# Patient Record
Sex: MALE | Race: OTHER | HISPANIC OR LATINO | Employment: UNEMPLOYED | URBAN - METROPOLITAN AREA
[De-identification: names, ages, dates, MRNs, and addresses within clinical notes are randomized per-mention and may not be internally consistent; named-entity substitution may affect disease eponyms.]

---

## 2021-06-07 DIAGNOSIS — U07.1 COVID-19 VIRUS DETECTED: ICD-10-CM

## 2021-06-10 PROBLEM — I10 ESSENTIAL HYPERTENSION: Status: ACTIVE | Noted: 2021-06-10

## 2021-06-10 PROBLEM — U07.1 COVID-19: Status: ACTIVE | Noted: 2021-06-10

## 2021-06-10 PROBLEM — E87.1 HYPONATREMIA: Status: ACTIVE | Noted: 2021-06-10

## 2021-06-11 PROBLEM — R73.03 PREDIABETES: Status: ACTIVE | Noted: 2021-06-11

## 2021-06-17 ENCOUNTER — PATIENT OUTREACH (OUTPATIENT)
Dept: ADMINISTRATIVE | Facility: CLINIC | Age: 51
End: 2021-06-17

## 2023-10-05 ENCOUNTER — DOCUMENTATION ONLY (OUTPATIENT)
Dept: SLEEP MEDICINE | Facility: HOSPITAL | Age: 53
End: 2023-10-05
Payer: MEDICAID

## 2023-10-17 ENCOUNTER — HOSPITAL ENCOUNTER (OUTPATIENT)
Dept: RADIOLOGY | Facility: HOSPITAL | Age: 53
Discharge: HOME | End: 2023-10-17
Attending: STUDENT IN AN ORGANIZED HEALTH CARE EDUCATION/TRAINING PROGRAM
Payer: MEDICAID

## 2023-10-17 DIAGNOSIS — R06.09 DOE (DYSPNEA ON EXERTION): ICD-10-CM

## 2023-10-17 DIAGNOSIS — J98.4 RESTRICTIVE LUNG DISEASE: ICD-10-CM

## 2023-10-17 PROCEDURE — 71250 CT THORAX DX C-: CPT | Mod: TC

## 2023-10-25 PROBLEM — R06.09 DOE (DYSPNEA ON EXERTION): Status: ACTIVE | Noted: 2023-10-25

## 2023-10-25 PROBLEM — J84.9 ILD (INTERSTITIAL LUNG DISEASE): Status: ACTIVE | Noted: 2023-10-25

## 2023-12-12 ENCOUNTER — DOCUMENTATION ONLY (OUTPATIENT)
Dept: SLEEP MEDICINE | Facility: HOSPITAL | Age: 53
End: 2023-12-12
Payer: MEDICAID

## 2023-12-12 PROBLEM — E66.01 CLASS 2 SEVERE OBESITY DUE TO EXCESS CALORIES WITH SERIOUS COMORBIDITY AND BODY MASS INDEX (BMI) OF 37.0 TO 37.9 IN ADULT: Status: ACTIVE | Noted: 2023-12-12

## 2023-12-12 PROBLEM — U07.1 COVID-19: Status: RESOLVED | Noted: 2021-06-10 | Resolved: 2023-12-12

## 2023-12-12 PROBLEM — I20.89 EQUIVALENT ANGINA: Status: ACTIVE | Noted: 2023-12-12

## 2023-12-12 PROBLEM — Z91.89 MULTIPLE RISK FACTORS FOR CORONARY ARTERY DISEASE: Status: ACTIVE | Noted: 2023-12-12

## 2023-12-12 PROBLEM — Z82.49 FAMILY HISTORY OF CARDIOVASCULAR DISEASE: Status: ACTIVE | Noted: 2023-12-12

## 2023-12-12 PROBLEM — Z86.16 HISTORY OF COVID-19: Status: ACTIVE | Noted: 2023-12-12

## 2023-12-12 PROBLEM — E66.812 CLASS 2 SEVERE OBESITY DUE TO EXCESS CALORIES WITH SERIOUS COMORBIDITY AND BODY MASS INDEX (BMI) OF 37.0 TO 37.9 IN ADULT: Status: ACTIVE | Noted: 2023-12-12

## 2023-12-12 PROBLEM — Z78.9 NONSMOKER: Status: ACTIVE | Noted: 2023-12-12

## 2024-01-03 ENCOUNTER — PATIENT MESSAGE (OUTPATIENT)
Dept: CARDIOTHORACIC SURGERY | Facility: CLINIC | Age: 54
End: 2024-01-03
Payer: MEDICAID

## 2024-01-05 NOTE — PROGRESS NOTES
History & Physical    SUBJECTIVE:     History of Present Illness:  Patient is a 53 y.o. male never smoker with HTN, IFG, obesity (BMI: 37.9) who is referred by Pulmonology, Dr. Pastor, evaluation of ILD and consideration for VATS wedge biopsy. Chest CT 12/27/23 shows reactive airspace disease (air trapping) as well as possible early interstitial lung disease with regions of bronchiectasis, pleuroparenchymal scarring and interlobular septal thickening. PFTs show restriction; however, FEV1 70%. Rheumatologic work-up normal.     Patient interview performed with a . Patient reports progressive dyspnea on exertion.  Denies SOB at rest. Denies cough, hemoptysis.     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      Review of patient's allergies indicates:   Allergen Reactions    Thiazides Other (See Comments)     Hyponatremia        Current Outpatient Medications   Medication Sig Dispense Refill    amLODIPine (NORVASC) 10 MG tablet Take 1 tablet (10 mg total) by mouth once daily. 90 tablet 3    losartan (COZAAR) 50 MG tablet Take 1 tablet (50 mg total) by mouth once daily. 30 tablet 5    metFORMIN (GLUCOPHAGE-XR) 500 MG ER 24hr tablet Take 2 tablets (1,000 mg total) by mouth daily with breakfast. Start with 1 tablet daily 1 week. Then go up to 2 tablets daily 180 tablet 3     Current Facility-Administered Medications   Medication Dose Route Frequency Provider Last Rate Last Admin    0.9%  NaCl infusion   Intravenous Continuous Brenton Kincaid MD   Stopped at 01/03/24 1046    atropine injection 0.25 mg  0.25 mg Intravenous PRN Brenton Kincaid MD   0.25 mg at 01/03/24 1039    DOBUTamine 50 mg in D5W 50 mL (1 mg/mL) cardiac stress test infusion  10 mcg/kg/min Intravenous Continuous Brenton Kincaid MD   Stopped at 01/03/24 1040       Past Medical History:   Diagnosis Date    Diabetes     Hypertension      No past surgical history on  file.  Family History   Problem Relation Age of Onset    Hypertension Mother     Diabetes Mother     No Known Problems Father      Social History     Tobacco Use    Smoking status: Never    Smokeless tobacco: Never   Substance Use Topics    Alcohol use: Yes        Review of Systems:  Review of Systems    OBJECTIVE:     Vital Signs (Most Recent)  There were no vitals filed for this visit.    Physical Exam:  Physical Exam    Chest CT 12/27/23:  1.  Findings of both reactive airspace disease (air trapping) as well as possible early interstitial lung disease with regions of bronchiectasis, pleuroparenchymal scarring and interlobular septal thickening    PFTs       ASSESSMENT/PLAN:     Patient is a 53 y.o. male never smoker with HTN, IFG, obesity (BMI: 37.9) who is referred by Pulmonology, Dr. Pastor, evaluation of ILD and consideration for VATS wedge biopsy.     PLAN:Plan     Consented for R VATS wedge biopsy  Order pre-op labwork, Stress ECHO

## 2024-01-08 ENCOUNTER — OFFICE VISIT (OUTPATIENT)
Dept: CARDIOTHORACIC SURGERY | Facility: CLINIC | Age: 54
End: 2024-01-08
Payer: MEDICAID

## 2024-01-08 ENCOUNTER — LAB VISIT (OUTPATIENT)
Dept: LAB | Facility: HOSPITAL | Age: 54
End: 2024-01-08
Payer: MEDICAID

## 2024-01-08 VITALS
SYSTOLIC BLOOD PRESSURE: 131 MMHG | BODY MASS INDEX: 38.16 KG/M2 | HEIGHT: 63 IN | WEIGHT: 215.38 LBS | OXYGEN SATURATION: 96 % | DIASTOLIC BLOOD PRESSURE: 85 MMHG | HEART RATE: 82 BPM | RESPIRATION RATE: 18 BRPM

## 2024-01-08 DIAGNOSIS — D68.9 COAGULOPATHY: ICD-10-CM

## 2024-01-08 DIAGNOSIS — J84.9 ILD (INTERSTITIAL LUNG DISEASE): Chronic | ICD-10-CM

## 2024-01-08 DIAGNOSIS — Z01.818 PRE-OP EVALUATION: Primary | ICD-10-CM

## 2024-01-08 DIAGNOSIS — Z01.818 PRE-OP EVALUATION: ICD-10-CM

## 2024-01-08 LAB
ALBUMIN SERPL BCP-MCNC: 3.7 G/DL (ref 3.5–5.2)
ALP SERPL-CCNC: 75 U/L (ref 55–135)
ALT SERPL W/O P-5'-P-CCNC: 29 U/L (ref 10–44)
ANION GAP SERPL CALC-SCNC: 9 MMOL/L (ref 8–16)
APTT PPP: 25.6 SEC (ref 21–32)
AST SERPL-CCNC: 26 U/L (ref 10–40)
BASOPHILS # BLD AUTO: 0.05 K/UL (ref 0–0.2)
BASOPHILS NFR BLD: 0.7 % (ref 0–1.9)
BILIRUB SERPL-MCNC: 0.7 MG/DL (ref 0.1–1)
BUN SERPL-MCNC: 13 MG/DL (ref 6–20)
CALCIUM SERPL-MCNC: 9.5 MG/DL (ref 8.7–10.5)
CHLORIDE SERPL-SCNC: 103 MMOL/L (ref 95–110)
CO2 SERPL-SCNC: 29 MMOL/L (ref 23–29)
CREAT SERPL-MCNC: 0.8 MG/DL (ref 0.5–1.4)
DIFFERENTIAL METHOD BLD: ABNORMAL
EOSINOPHIL # BLD AUTO: 0.3 K/UL (ref 0–0.5)
EOSINOPHIL NFR BLD: 4.6 % (ref 0–8)
ERYTHROCYTE [DISTWIDTH] IN BLOOD BY AUTOMATED COUNT: 12.9 % (ref 11.5–14.5)
EST. GFR  (NO RACE VARIABLE): >60 ML/MIN/1.73 M^2
GLUCOSE SERPL-MCNC: 94 MG/DL (ref 70–110)
HCT VFR BLD AUTO: 41.1 % (ref 40–54)
HGB BLD-MCNC: 14.5 G/DL (ref 14–18)
IMM GRANULOCYTES # BLD AUTO: 0.01 K/UL (ref 0–0.04)
IMM GRANULOCYTES NFR BLD AUTO: 0.1 % (ref 0–0.5)
INR PPP: 1 (ref 0.8–1.2)
LYMPHOCYTES # BLD AUTO: 1.2 K/UL (ref 1–4.8)
LYMPHOCYTES NFR BLD: 17.2 % (ref 18–48)
MCH RBC QN AUTO: 30.7 PG (ref 27–31)
MCHC RBC AUTO-ENTMCNC: 35.3 G/DL (ref 32–36)
MCV RBC AUTO: 87 FL (ref 82–98)
MONOCYTES # BLD AUTO: 0.6 K/UL (ref 0.3–1)
MONOCYTES NFR BLD: 8.4 % (ref 4–15)
NEUTROPHILS # BLD AUTO: 4.8 K/UL (ref 1.8–7.7)
NEUTROPHILS NFR BLD: 69 % (ref 38–73)
NRBC BLD-RTO: 0 /100 WBC
PLATELET # BLD AUTO: 264 K/UL (ref 150–450)
PMV BLD AUTO: 9.8 FL (ref 9.2–12.9)
POTASSIUM SERPL-SCNC: 3.9 MMOL/L (ref 3.5–5.1)
PREALB SERPL-MCNC: 19 MG/DL (ref 20–43)
PROT SERPL-MCNC: 7.7 G/DL (ref 6–8.4)
PROTHROMBIN TIME: 10.8 SEC (ref 9–12.5)
RBC # BLD AUTO: 4.72 M/UL (ref 4.6–6.2)
SODIUM SERPL-SCNC: 141 MMOL/L (ref 136–145)
WBC # BLD AUTO: 6.93 K/UL (ref 3.9–12.7)

## 2024-01-08 PROCEDURE — 3044F HG A1C LEVEL LT 7.0%: CPT | Mod: CPTII,,, | Performed by: STUDENT IN AN ORGANIZED HEALTH CARE EDUCATION/TRAINING PROGRAM

## 2024-01-08 PROCEDURE — 3008F BODY MASS INDEX DOCD: CPT | Mod: CPTII,,, | Performed by: STUDENT IN AN ORGANIZED HEALTH CARE EDUCATION/TRAINING PROGRAM

## 2024-01-08 PROCEDURE — 80053 COMPREHEN METABOLIC PANEL: CPT

## 2024-01-08 PROCEDURE — 99204 OFFICE O/P NEW MOD 45 MIN: CPT | Mod: 57,S$PBB,, | Performed by: STUDENT IN AN ORGANIZED HEALTH CARE EDUCATION/TRAINING PROGRAM

## 2024-01-08 PROCEDURE — 3079F DIAST BP 80-89 MM HG: CPT | Mod: CPTII,,, | Performed by: STUDENT IN AN ORGANIZED HEALTH CARE EDUCATION/TRAINING PROGRAM

## 2024-01-08 PROCEDURE — 4010F ACE/ARB THERAPY RXD/TAKEN: CPT | Mod: CPTII,,, | Performed by: STUDENT IN AN ORGANIZED HEALTH CARE EDUCATION/TRAINING PROGRAM

## 2024-01-08 PROCEDURE — 85730 THROMBOPLASTIN TIME PARTIAL: CPT

## 2024-01-08 PROCEDURE — 85610 PROTHROMBIN TIME: CPT

## 2024-01-08 PROCEDURE — 99214 OFFICE O/P EST MOD 30 MIN: CPT | Mod: PBBFAC | Performed by: STUDENT IN AN ORGANIZED HEALTH CARE EDUCATION/TRAINING PROGRAM

## 2024-01-08 PROCEDURE — 36415 COLL VENOUS BLD VENIPUNCTURE: CPT

## 2024-01-08 PROCEDURE — 3075F SYST BP GE 130 - 139MM HG: CPT | Mod: CPTII,,, | Performed by: STUDENT IN AN ORGANIZED HEALTH CARE EDUCATION/TRAINING PROGRAM

## 2024-01-08 PROCEDURE — 99999 PR PBB SHADOW E&M-EST. PATIENT-LVL IV: CPT | Mod: PBBFAC,,, | Performed by: STUDENT IN AN ORGANIZED HEALTH CARE EDUCATION/TRAINING PROGRAM

## 2024-01-08 PROCEDURE — 84134 ASSAY OF PREALBUMIN: CPT

## 2024-01-08 PROCEDURE — 85025 COMPLETE CBC W/AUTO DIFF WBC: CPT

## 2024-01-23 ENCOUNTER — TELEPHONE (OUTPATIENT)
Dept: CARDIOTHORACIC SURGERY | Facility: CLINIC | Age: 54
End: 2024-01-23
Payer: MEDICAID

## 2024-01-23 ENCOUNTER — ANESTHESIA EVENT (OUTPATIENT)
Dept: SURGERY | Facility: HOSPITAL | Age: 54
DRG: 167 | End: 2024-01-23
Payer: MEDICAID

## 2024-01-23 ENCOUNTER — PATIENT MESSAGE (OUTPATIENT)
Dept: CARDIOTHORACIC SURGERY | Facility: CLINIC | Age: 54
End: 2024-01-23
Payer: MEDICAID

## 2024-01-23 NOTE — ANESTHESIA PREPROCEDURE EVALUATION
[facility]  Anesthesia Pre-Operative Evaluation         Patient Name: Mk Gaines  YOB: 1970  MRN: [id number]    SUBJECTIVE:     Pre-operative evaluation for Procedure(s) (LRB):  RIGHT VATS WEDGE BIOPSY (Right)     01/23/2024    Mk Gaines is a 53 y.o. male w/ a significant PMHx of HTN, ILD, FRANKLIN, NESTOR, prediabetes, and obesity. Chest CT 12/27/23 shows reactive airspace disease (air trapping) as well as possible early interstitial lung disease with regions of bronchiectasis, pleuroparenchymal scarring and interlobular septal thickening. PFTs show restriction, no obstruction (FEV1 70%), normal DLCO       Patient now presents for the above procedure(s).    Results for orders placed during the hospital encounter of 10/17/23    Echo    Interpretation Summary    Left Ventricle: The left ventricle is normal in size. Normal wall thickness. Normal wall motion. There is normal systolic function. Ejection fraction by visual approximation is 60%. Biplane (2D) method of discs ejection fraction is 55%. Global longitudinal strain is -16.9%. There is normal diastolic function.    Right Ventricle: Normal right ventricular cavity size. Systolic function is normal.    Normal atria.    No clinically significant valvular abnormalities.    Stress Echo 01/03/2024   Post-stress Impression: The study is normal and negative with no echocardiographic evidence of stress induced ischemia.    ECG Conclusion: The ECG portion of the study is negative for ischemia.    Left Ventricle: The left ventricle is normal in size. Ventricular mass is normal. Normal wall thickness. Normal wall motion. There is normal systolic function. Ejection fraction by visual approximation is 60%.    Right Ventricle: Right ventricle was not well visualized.. Normal right ventricular cavity size. Right ventricle wall motion  is normal. Systolic function is normal.    Left Atrium: Left atrium was not well visualized. Normal left atrial  size.    Stress Protocol: The patient was infused intravenously with dobutamine. The patient received a graduated infusion of the stress agent beginning at 10.0 mcg/kg/min to a peak dose of 30.0 mcg/kg/min. The peak heart rate was 157 bpm, which is 94% of age predicted maximum heart rate. The patient was also given atropine. The patient reported no symptoms during the stress test. The test was stopped because the end of the protocol was reached.    Post-stress Echo: Left ventricle cavity appears normal post-stress. The left ventricle systolic function is normal. The post-stress images show normal wall motion. Right ventricle systolic function is normal.    Spirometry without Bronchodilator  Order: [id number]  Status: Final result       Visible to patient: Yes (seen)       Next appt: 02/01/2024 at 01:30 PM in Pre-Admission Testing ([facility] PRE-ADMIT[facility])       Dx: ILD (interstitial lung disease)    0 Result Notes        Component Ref Range & Units 11:54 3 mo ago   Interpretation  DATE OF PROCEDURE: 12/27/2023  1. Spirometry meets validity criteria.  2. No obstruction by GOLD criteria. Similarly reduced FEV1 and FVC consistent with known restriction.  3. Compared to spirometry from 9/2023 there is no significant change. Spirometry shows normal airflow without obstruction.  Lung volumes show moderately severe restriction is present. TLC 52% predicted.  DLCO is normal.  MVV is reduced out of proportion to FEV1 suggesting poor effort or difficulty performing the maneuver or neuromuscular weakness. Correlate clinically.  Â   Notes:  No recent hemoglobin value available. DLCO interpretation assumes a normal hemoglobin value.   Pre FVC 2.74 - 4.24 L 2.46 Low  2.32 Low  R   Pre FEV1 2.17 - 3.44 L 2.00 Low  1.87 Low  R   Pre FEV1 FVC 70.26 - 89.92 % 81.26 80.67 R   Pre FEF 25 75 1.41 - 4.39 L/s 2.17 2.00 R   Pre PEF 5.71 - 9.69 L/s 7.18 6.66 R   Pre  sec 8.98 11.20   Pre MVV 97.03 - 131.28 L/min 63.67 Low  66.84 Low     FVC Ref  3.49 3.79   FVC LLN  2.74 2.95   FVC Pre Ref % 70.5 61.1   FEV1 Ref  2.82 3.04   FEV1 LLN  2.17 2.35   FEV1 Pre Ref % 70.9 61.7   FEV1 FVC Ref  81 80   FEV1 FVC LLN  70 69   FEV1 FVC Pre Ref % 100.6 100.9   FEF 25 75 Ref  2.70 2.85   FEF 25 75 LLN  1.41 1.51   FEF 25 75 Pre Ref % 80.4 70.1   PEF Ref  7.70 7.96   PEF LLN  5.71 5.81   PEF Pre Ref % 93.3 83.6   MVV Ref  114 114   MVV LLN  97 97   MVV Pre Ref % 55.8 58.6   Resulting Agency  [facility]               Last Resulted: 01/23/24 11:54 CST               LDA: None documented.       Prev airway: None documented.    Drips: None documented.      Patient Active Problem List   Diagnosis    Essential hypertension    Hyponatremia    Prediabetes    FRANKLIN (dyspnea on exertion)    ILD (interstitial lung disease)    Class 2 severe obesity due to excess calories with serious comorbidity and body mass index (BMI) of 37.0 to 37.9 in adult    Multiple risk factors for coronary artery disease    Nonsmoker    Equivalent angina    Family history of cardiovascular disease    History of COVID-19       Review of patient's allergies indicates:   Allergen Reactions    Thiazides Other (See Comments)     Hyponatremia        Current Inpatient Medications:      Current Facility-Administered Medications on File Prior to Encounter   Medication Dose Route Frequency Provider Last Rate Last Admin    0.9%  NaCl infusion   Intravenous Continuous Brenton Kincaid MD   Stopped at 01/03/24 1046    atropine injection 0.25 mg  0.25 mg Intravenous PRN Brenton Kincaid MD   0.25 mg at 01/03/24 1039    DOBUTamine 50 mg in D5W 50 mL (1 mg/mL) cardiac stress test infusion  10 mcg/kg/min Intravenous Continuous Brenton Kincaid MD   Stopped at 01/03/24 1040     Current Outpatient Medications on File Prior to Encounter   Medication Sig Dispense Refill    amLODIPine (NORVASC) 10 MG tablet Take 1 tablet (10 mg total) by mouth once daily. (Patient taking differently: Take 10 mg by mouth every  evening.) 90 tablet 3    metFORMIN (GLUCOPHAGE-XR) 500 MG ER 24hr tablet Take 2 tablets (1,000 mg total) by mouth daily with breakfast. Start with 1 tablet daily 1 week. Then go up to 2 tablets daily 180 tablet 3       No past surgical history on file.    Social History     Socioeconomic History    Marital status:    Tobacco Use    Smoking status: Never    Smokeless tobacco: Never   Substance and Sexual Activity    Alcohol use: Yes       OBJECTIVE:     Vital Signs Range (Last 24H):         Significant Labs:  Lab Results   Component Value Date    WBC 7.97 01/17/2024    HGB 15.4 01/17/2024    HCT 47.3 01/17/2024     01/17/2024    CHOL 130 01/17/2024    TRIG 48 01/17/2024    HDL 40 01/17/2024    ALT 29 01/08/2024    AST 26 01/08/2024     01/17/2024    K 4.0 01/17/2024     01/17/2024    CREATININE 0.8 01/17/2024    BUN 9 01/17/2024    CO2 28 01/17/2024    TSH 1.550 01/17/2024    INR 1.0 01/08/2024    HGBA1C 6.0 (H) 01/17/2024       Diagnostic Studies: No relevant studies.    EKG:   Results for orders placed or performed during the hospital encounter of 10/01/23   EKG 12-lead    Collection Time: 10/01/23  7:27 PM    Narrative    Test Reason : SOB    Vent. Rate : 076 BPM     Atrial Rate : 076 BPM     P-R Int : 156 ms          QRS Dur : 102 ms      QT Int : 358 ms       P-R-T Axes : 053 -14 012 degrees     QTc Int : 402 ms    Normal sinus rhythm  Nonspecific intraventricular conduction delay  Leftward axis  When compared with ECG of 11-SEP-2023 23:45,  Incomplete right bundle branch block is no longer Present  Confirmed by Uday Boone MD ([id number]) on 10/2/2023 9:01:36 AM    Referred By: [email or web address]   SELF           Confirmed By:Uday Boone MD       2D ECHO:  TTE:  Results for orders placed or performed during the hospital encounter of 10/17/23   Echo   Result Value Ref Range    BSA 2.06 m2    LVOT stroke volume 70.55 cm3    LVIDd 4.70 3.5 - 6.0 cm    LV Systolic Volume 19.02 mL    LV Systolic  Volume Index 9.7 mL/m2    LVIDs 2.34 2.1 - 4.0 cm    LV Diastolic Volume 102.26 mL    LV Diastolic Volume Index 51.91 mL/m2    IVS 1.10 0.6 - 1.1 cm    LVOT diameter 2.21 cm    LVOT area 3.8 cm2    FS 50 (A) 28 - 44 %    Left Ventricle Relative Wall Thickness 0.37 cm    Posterior Wall 0.87 0.6 - 1.1 cm    LV mass 161.11 g    LV Mass Index 82 g/m2    MV Peak E Jerad 0.64 m/s    TDI LATERAL 0.08 m/s    TDI SEPTAL 0.07 m/s    E/E' ratio 8.53 m/s    MV Peak A Jerad 0.75 m/s    TR Max Jerad 1.37 m/s    E/A ratio 0.85     E wave deceleration time 197.00 msec    LV SEPTAL E/E' RATIO 9.14 m/s    LV LATERAL E/E' RATIO 8.00 m/s    PV Peak S Jerad 0.69 m/s    PV Peak D Jerad 0.39 m/s    Pulm vein S/D ratio 1.77     LVOT peak jerad 1.17 m/s    Left Ventricular Outflow Tract Mean Velocity 0.88 cm/s    Left Ventricular Outflow Tract Mean Gradient 3.37 mmHg    LA size 3.34 cm    Left Atrium Minor Axis 5.22 cm    Left Atrium Major Axis 4.36 cm    LA volume (mod) 25.61 cm3    LA Volume Index (Mod) 13.0 mL/m2    RVDD 3.02 cm    Right ventricular length in diastole (apical 4-chamber view) 8.51 cm    RV mid diameter 3.11 cm    RV S' 14.49 cm/s    RA area 13.2 cm2    RA Major Axis 4.14 cm    RA Width 3.90 cm    Right Atrium Volume Systolic 36.56 mL    AV mean gradient 7 mmHg    AV peak gradient 13 mmHg    Ao peak jerad 1.81 m/s    Ao VTI 27.10 cm    LVOT peak VTI 18.40 cm    AV valve area 2.60 cm²    AV Velocity Ratio 0.65     AV index (prosthetic) 0.68     RUPERT by Velocity Ratio 2.48 cm²    MV stenosis pressure 1/2 time 57.13 ms    MV valve area p 1/2 method 3.85 cm2    Triscuspid Valve Regurgitation Peak Gradient 8 mmHg    PV PEAK VELOCITY 1.28 m/s    PV peak gradient 7 mmHg    Pulmonary Valve Mean Velocity 0.88 m/s    STJ 2.66 cm    Ascending aorta 3.05 cm    IVC diameter 1.44 cm    Mean e' 0.08 m/s    ZLVIDS -3.14     ZLVIDD -1.86     TV resting pulmonary artery pressure 11 mmHg    RV TB RVSP 4 mmHg    Est. RA pres 3 mmHg    EF 60 %    Morgan's  Biplane MOD Ejection Fraction 55 %    LA Volume Index 20.5 mL/m2    LA volume 40.47 cm3    GLS 16.9 %    LA WIDTH 3.0 cm    TASV 14.5 cm/s    TAPSE 1.60 cm    Sinus 3.5 cm    Narrative      Left Ventricle: The left ventricle is normal in size. Normal wall   thickness. Normal wall motion. There is normal systolic function. Ejection   fraction by visual approximation is 60%. Biplane (2D) method of discs   ejection fraction is 55%. Global longitudinal strain is -16.9%. There is   normal diastolic function.    Right Ventricle: Normal right ventricular cavity size. Systolic   function is normal.    Normal atria.    No clinically significant valvular abnormalities.         VIK:  No results found for this or any previous visit.    ASSESSMENT/PLAN:           Pre-op Assessment    I have reviewed the Patient Summary Reports.     I have reviewed the Nursing Notes. I have reviewed the NPO Status.   I have reviewed the Medications.     Review of Systems  Anesthesia Hx:  No problems with previous Anesthesia             Denies Family Hx of Anesthesia complications.    Denies Personal Hx of Anesthesia complications.                    Social:  Non-Smoker       Hematology/Oncology:  Hematology Normal                                     EENT/Dental:  EENT/Dental Normal           Cardiovascular:     Hypertension  Denies Valvular problems/Murmurs.  Denies MI.       Angina   PND   FRANKLIN                            Pulmonary:      Shortness of breath   ILD               Renal/:  Renal/ Normal                 Hepatic/GI:  Hepatic/GI Normal                 Musculoskeletal:  Musculoskeletal Normal                Neurological:  Neurology Normal                                      Endocrine:  Diabetes (pre-diabetic)               Physical Exam  General: Well nourished, Cooperative, Alert and Oriented    Airway:  Mallampati: III   Mouth Opening: Normal  TM Distance: Normal  Tongue: Large  Neck ROM: Normal  ROM    Dental:  Intact        Anesthesia Plan  Type of Anesthesia, risks & benefits discussed:    Anesthesia Type: Gen ETT  Intra-op Monitoring Plan: Standard ASA Monitors  Post Op Pain Control Plan: multimodal analgesia and IV/PO Opioids PRN  Induction:  IV  Airway Plan: Video and Direct, Post-Induction  Informed Consent: Informed consent signed with the Patient and all parties understand the risks and agree with anesthesia plan.  All questions answered.   ASA Score: 3  Day of Surgery Review of History & Physical: H&P Update referred to the surgeon/provider.    Ready For Surgery From Anesthesia Perspective.     .

## 2024-01-23 NOTE — PRE-PROCEDURE INSTRUCTIONS
PreOp Instructions given: Call made w/[facility]  [facility] #[id number]  - Verbal medication information (what to hold and what to take)   - NPO guidelines 2400  - Arrival place directions given; time to be given the day before procedure by the   Surgeon's Office 0600 dosc  - Bathing with antibacterial soap   - Don't wear any jewelry or bring any valuables AM of surgery   - No makeup or moisturizer to face   - No perfume/cologne, powder, lotions or aftershave   Pt. verbalized understanding.   Pt denies any h/o Anesthesia/Sedation complications or side effects.

## 2024-01-24 ENCOUNTER — HOSPITAL ENCOUNTER (INPATIENT)
Facility: HOSPITAL | Age: 54
LOS: 2 days | Discharge: HOME | DRG: 167 | End: 2024-01-26
Attending: STUDENT IN AN ORGANIZED HEALTH CARE EDUCATION/TRAINING PROGRAM | Admitting: STUDENT IN AN ORGANIZED HEALTH CARE EDUCATION/TRAINING PROGRAM
Payer: MEDICAID

## 2024-01-24 ENCOUNTER — ANESTHESIA (OUTPATIENT)
Dept: SURGERY | Facility: HOSPITAL | Age: 54
DRG: 167 | End: 2024-01-24
Payer: MEDICAID

## 2024-01-24 DIAGNOSIS — J84.9 ILD (INTERSTITIAL LUNG DISEASE): Primary | ICD-10-CM

## 2024-01-24 LAB
ABO + RH BLD: NORMAL
BLD GP AB SCN CELLS X3 SERPL QL: NORMAL
GRAM STN SPEC: NORMAL
POCT GLUCOSE: 106 MG/DL (ref 70–110)
POCT GLUCOSE: 124 MG/DL (ref 70–110)
POCT GLUCOSE: 139 MG/DL (ref 70–110)
POCT GLUCOSE: 175 MG/DL (ref 70–110)
SPECIMEN OUTDATE: NORMAL

## 2024-01-24 PROCEDURE — 88307 TISSUE EXAM BY PATHOLOGIST: CPT | Mod: 26,,, | Performed by: PATHOLOGY

## 2024-01-24 PROCEDURE — 25000242 PHARM REV CODE 250 ALT 637 W/ HCPCS: Performed by: STUDENT IN AN ORGANIZED HEALTH CARE EDUCATION/TRAINING PROGRAM

## 2024-01-24 PROCEDURE — 63600175 PHARM REV CODE 636 W HCPCS

## 2024-01-24 PROCEDURE — 87075 CULTR BACTERIA EXCEPT BLOOD: CPT | Mod: 59 | Performed by: STUDENT IN AN ORGANIZED HEALTH CARE EDUCATION/TRAINING PROGRAM

## 2024-01-24 PROCEDURE — 87206 SMEAR FLUORESCENT/ACID STAI: CPT | Performed by: STUDENT IN AN ORGANIZED HEALTH CARE EDUCATION/TRAINING PROGRAM

## 2024-01-24 PROCEDURE — 36000710: Performed by: STUDENT IN AN ORGANIZED HEALTH CARE EDUCATION/TRAINING PROGRAM

## 2024-01-24 PROCEDURE — C1729 CATH, DRAINAGE: HCPCS | Performed by: STUDENT IN AN ORGANIZED HEALTH CARE EDUCATION/TRAINING PROGRAM

## 2024-01-24 PROCEDURE — 20600001 HC STEP DOWN PRIVATE ROOM

## 2024-01-24 PROCEDURE — 94761 N-INVAS EAR/PLS OXIMETRY MLT: CPT

## 2024-01-24 PROCEDURE — 87102 FUNGUS ISOLATION CULTURE: CPT | Performed by: STUDENT IN AN ORGANIZED HEALTH CARE EDUCATION/TRAINING PROGRAM

## 2024-01-24 PROCEDURE — 31622 DX BRONCHOSCOPE/WASH: CPT | Mod: 51,,, | Performed by: STUDENT IN AN ORGANIZED HEALTH CARE EDUCATION/TRAINING PROGRAM

## 2024-01-24 PROCEDURE — 88307 TISSUE EXAM BY PATHOLOGIST: CPT | Mod: 59 | Performed by: PATHOLOGY

## 2024-01-24 PROCEDURE — 27000221 HC OXYGEN, UP TO 24 HOURS

## 2024-01-24 PROCEDURE — 63600175 PHARM REV CODE 636 W HCPCS: Performed by: STUDENT IN AN ORGANIZED HEALTH CARE EDUCATION/TRAINING PROGRAM

## 2024-01-24 PROCEDURE — D9220A PRA ANESTHESIA: Mod: ,,, | Performed by: ANESTHESIOLOGY

## 2024-01-24 PROCEDURE — 88312 SPECIAL STAINS GROUP 1: CPT | Mod: 59 | Performed by: PATHOLOGY

## 2024-01-24 PROCEDURE — 87076 CULTURE ANAEROBE IDENT EACH: CPT | Performed by: STUDENT IN AN ORGANIZED HEALTH CARE EDUCATION/TRAINING PROGRAM

## 2024-01-24 PROCEDURE — 27201423 OPTIME MED/SURG SUP & DEVICES STERILE SUPPLY: Performed by: STUDENT IN AN ORGANIZED HEALTH CARE EDUCATION/TRAINING PROGRAM

## 2024-01-24 PROCEDURE — 37000008 HC ANESTHESIA 1ST 15 MINUTES: Performed by: STUDENT IN AN ORGANIZED HEALTH CARE EDUCATION/TRAINING PROGRAM

## 2024-01-24 PROCEDURE — 71000039 HC RECOVERY, EACH ADD'L HOUR: Performed by: STUDENT IN AN ORGANIZED HEALTH CARE EDUCATION/TRAINING PROGRAM

## 2024-01-24 PROCEDURE — 36415 COLL VENOUS BLD VENIPUNCTURE: CPT | Performed by: PHYSICIAN ASSISTANT

## 2024-01-24 PROCEDURE — 88112 CYTOPATH CELL ENHANCE TECH: CPT | Performed by: PATHOLOGY

## 2024-01-24 PROCEDURE — 94640 AIRWAY INHALATION TREATMENT: CPT

## 2024-01-24 PROCEDURE — 82962 GLUCOSE BLOOD TEST: CPT | Performed by: STUDENT IN AN ORGANIZED HEALTH CARE EDUCATION/TRAINING PROGRAM

## 2024-01-24 PROCEDURE — 25000003 PHARM REV CODE 250: Performed by: STUDENT IN AN ORGANIZED HEALTH CARE EDUCATION/TRAINING PROGRAM

## 2024-01-24 PROCEDURE — 87070 CULTURE OTHR SPECIMN AEROBIC: CPT | Performed by: STUDENT IN AN ORGANIZED HEALTH CARE EDUCATION/TRAINING PROGRAM

## 2024-01-24 PROCEDURE — 25000003 PHARM REV CODE 250

## 2024-01-24 PROCEDURE — 87015 SPECIMEN INFECT AGNT CONCNTJ: CPT | Performed by: STUDENT IN AN ORGANIZED HEALTH CARE EDUCATION/TRAINING PROGRAM

## 2024-01-24 PROCEDURE — 25000003 PHARM REV CODE 250: Performed by: PHYSICIAN ASSISTANT

## 2024-01-24 PROCEDURE — 71000015 HC POSTOP RECOV 1ST HR: Performed by: STUDENT IN AN ORGANIZED HEALTH CARE EDUCATION/TRAINING PROGRAM

## 2024-01-24 PROCEDURE — 87205 SMEAR GRAM STAIN: CPT | Performed by: STUDENT IN AN ORGANIZED HEALTH CARE EDUCATION/TRAINING PROGRAM

## 2024-01-24 PROCEDURE — 71000033 HC RECOVERY, INTIAL HOUR: Performed by: STUDENT IN AN ORGANIZED HEALTH CARE EDUCATION/TRAINING PROGRAM

## 2024-01-24 PROCEDURE — 88112 CYTOPATH CELL ENHANCE TECH: CPT | Mod: 26,,, | Performed by: PATHOLOGY

## 2024-01-24 PROCEDURE — 37000009 HC ANESTHESIA EA ADD 15 MINS: Performed by: STUDENT IN AN ORGANIZED HEALTH CARE EDUCATION/TRAINING PROGRAM

## 2024-01-24 PROCEDURE — 99900035 HC TECH TIME PER 15 MIN (STAT)

## 2024-01-24 PROCEDURE — 87116 MYCOBACTERIA CULTURE: CPT | Mod: 59 | Performed by: STUDENT IN AN ORGANIZED HEALTH CARE EDUCATION/TRAINING PROGRAM

## 2024-01-24 PROCEDURE — 63600175 PHARM REV CODE 636 W HCPCS: Mod: JZ,JG | Performed by: STUDENT IN AN ORGANIZED HEALTH CARE EDUCATION/TRAINING PROGRAM

## 2024-01-24 PROCEDURE — 25000242 PHARM REV CODE 250 ALT 637 W/ HCPCS

## 2024-01-24 PROCEDURE — 88312 SPECIAL STAINS GROUP 1: CPT | Mod: 26,,, | Performed by: PATHOLOGY

## 2024-01-24 PROCEDURE — 36000711: Performed by: STUDENT IN AN ORGANIZED HEALTH CARE EDUCATION/TRAINING PROGRAM

## 2024-01-24 PROCEDURE — 86901 BLOOD TYPING SEROLOGIC RH(D): CPT | Performed by: PHYSICIAN ASSISTANT

## 2024-01-24 PROCEDURE — 32607 THORACOSCOPY W/BX INFILTRATE: CPT | Mod: RT,,, | Performed by: STUDENT IN AN ORGANIZED HEALTH CARE EDUCATION/TRAINING PROGRAM

## 2024-01-24 RX ORDER — PHENYLEPHRINE HYDROCHLORIDE 10 MG/ML
INJECTION INTRAVENOUS
Status: DISCONTINUED | OUTPATIENT
Start: 2024-01-24 | End: 2024-01-24

## 2024-01-24 RX ORDER — GABAPENTIN 300 MG/1
300 CAPSULE ORAL 3 TIMES DAILY
Status: DISCONTINUED | OUTPATIENT
Start: 2024-01-24 | End: 2024-01-25

## 2024-01-24 RX ORDER — DEXAMETHASONE SODIUM PHOSPHATE 4 MG/ML
INJECTION, SOLUTION INTRA-ARTICULAR; INTRALESIONAL; INTRAMUSCULAR; INTRAVENOUS; SOFT TISSUE
Status: DISCONTINUED | OUTPATIENT
Start: 2024-01-24 | End: 2024-01-24

## 2024-01-24 RX ORDER — LIDOCAINE HYDROCHLORIDE 20 MG/ML
INJECTION, SOLUTION EPIDURAL; INFILTRATION; INTRACAUDAL; PERINEURAL
Status: DISCONTINUED | OUTPATIENT
Start: 2024-01-24 | End: 2024-01-24

## 2024-01-24 RX ORDER — ENOXAPARIN SODIUM 100 MG/ML
40 INJECTION SUBCUTANEOUS EVERY 24 HOURS
Status: DISCONTINUED | OUTPATIENT
Start: 2024-01-25 | End: 2024-01-26 | Stop reason: HOSPADM

## 2024-01-24 RX ORDER — ONDANSETRON HYDROCHLORIDE 2 MG/ML
INJECTION, SOLUTION INTRAVENOUS
Status: DISCONTINUED | OUTPATIENT
Start: 2024-01-24 | End: 2024-01-24

## 2024-01-24 RX ORDER — ACETAMINOPHEN 500 MG
1000 TABLET ORAL
Status: COMPLETED | OUTPATIENT
Start: 2024-01-24 | End: 2024-01-24

## 2024-01-24 RX ORDER — OXYCODONE HYDROCHLORIDE 5 MG/1
5 TABLET ORAL EVERY 4 HOURS PRN
Status: DISCONTINUED | OUTPATIENT
Start: 2024-01-24 | End: 2024-01-26 | Stop reason: HOSPADM

## 2024-01-24 RX ORDER — IBUPROFEN 200 MG
16 TABLET ORAL
Status: DISCONTINUED | OUTPATIENT
Start: 2024-01-24 | End: 2024-01-26 | Stop reason: HOSPADM

## 2024-01-24 RX ORDER — MIDAZOLAM HYDROCHLORIDE 1 MG/ML
INJECTION INTRAMUSCULAR; INTRAVENOUS
Status: DISCONTINUED | OUTPATIENT
Start: 2024-01-24 | End: 2024-01-24

## 2024-01-24 RX ORDER — PROPOFOL 10 MG/ML
VIAL (ML) INTRAVENOUS
Status: DISCONTINUED | OUTPATIENT
Start: 2024-01-24 | End: 2024-01-24

## 2024-01-24 RX ORDER — INSULIN ASPART 100 [IU]/ML
0-5 INJECTION, SOLUTION INTRAVENOUS; SUBCUTANEOUS
Status: DISCONTINUED | OUTPATIENT
Start: 2024-01-24 | End: 2024-01-26 | Stop reason: HOSPADM

## 2024-01-24 RX ORDER — KETAMINE HCL IN 0.9 % NACL 50 MG/5 ML
SYRINGE (ML) INTRAVENOUS
Status: DISCONTINUED | OUTPATIENT
Start: 2024-01-24 | End: 2024-01-24

## 2024-01-24 RX ORDER — GLUCAGON 1 MG
1 KIT INJECTION
Status: DISCONTINUED | OUTPATIENT
Start: 2024-01-24 | End: 2024-01-26 | Stop reason: HOSPADM

## 2024-01-24 RX ORDER — FENTANYL CITRATE 50 UG/ML
INJECTION, SOLUTION INTRAMUSCULAR; INTRAVENOUS
Status: DISCONTINUED | OUTPATIENT
Start: 2024-01-24 | End: 2024-01-24

## 2024-01-24 RX ORDER — BUPIVACAINE HYDROCHLORIDE 2.5 MG/ML
INJECTION, SOLUTION EPIDURAL; INFILTRATION; INTRACAUDAL
Status: DISCONTINUED | OUTPATIENT
Start: 2024-01-24 | End: 2024-01-24 | Stop reason: HOSPADM

## 2024-01-24 RX ORDER — POLYETHYLENE GLYCOL 3350 17 G/17G
17 POWDER, FOR SOLUTION ORAL DAILY
Status: DISCONTINUED | OUTPATIENT
Start: 2024-01-24 | End: 2024-01-26

## 2024-01-24 RX ORDER — SODIUM CHLORIDE 0.9 % (FLUSH) 0.9 %
10 SYRINGE (ML) INJECTION
Status: DISCONTINUED | OUTPATIENT
Start: 2024-01-24 | End: 2024-01-24 | Stop reason: HOSPADM

## 2024-01-24 RX ORDER — NALOXONE HCL 0.4 MG/ML
VIAL (ML) INJECTION
Status: DISCONTINUED | OUTPATIENT
Start: 2024-01-24 | End: 2024-01-24

## 2024-01-24 RX ORDER — BISACODYL 10 MG/1
10 SUPPOSITORY RECTAL DAILY PRN
Status: DISCONTINUED | OUTPATIENT
Start: 2024-01-24 | End: 2024-01-26 | Stop reason: HOSPADM

## 2024-01-24 RX ORDER — OXYCODONE HYDROCHLORIDE 5 MG/1
5 TABLET ORAL
Status: DISCONTINUED | OUTPATIENT
Start: 2024-01-24 | End: 2024-01-24 | Stop reason: HOSPADM

## 2024-01-24 RX ORDER — HALOPERIDOL 5 MG/ML
0.5 INJECTION INTRAMUSCULAR EVERY 10 MIN PRN
Status: DISCONTINUED | OUTPATIENT
Start: 2024-01-24 | End: 2024-01-24 | Stop reason: HOSPADM

## 2024-01-24 RX ORDER — LIDOCAINE 50 MG/G
1 PATCH TOPICAL
Status: DISCONTINUED | OUTPATIENT
Start: 2024-01-24 | End: 2024-01-26 | Stop reason: HOSPADM

## 2024-01-24 RX ORDER — AMOXICILLIN 250 MG
1 CAPSULE ORAL 2 TIMES DAILY
Status: DISCONTINUED | OUTPATIENT
Start: 2024-01-24 | End: 2024-01-26 | Stop reason: HOSPADM

## 2024-01-24 RX ORDER — ACETAMINOPHEN 500 MG
1000 TABLET ORAL EVERY 8 HOURS
Status: DISCONTINUED | OUTPATIENT
Start: 2024-01-24 | End: 2024-01-26 | Stop reason: HOSPADM

## 2024-01-24 RX ORDER — IPRATROPIUM BROMIDE AND ALBUTEROL SULFATE 2.5; .5 MG/3ML; MG/3ML
3 SOLUTION RESPIRATORY (INHALATION) EVERY 6 HOURS
Status: DISCONTINUED | OUTPATIENT
Start: 2024-01-24 | End: 2024-01-26

## 2024-01-24 RX ORDER — METHOCARBAMOL 500 MG/1
500 TABLET, FILM COATED ORAL 4 TIMES DAILY
Status: DISCONTINUED | OUTPATIENT
Start: 2024-01-24 | End: 2024-01-25

## 2024-01-24 RX ORDER — ONDANSETRON 4 MG/1
4 TABLET, ORALLY DISINTEGRATING ORAL EVERY 6 HOURS PRN
Status: DISCONTINUED | OUTPATIENT
Start: 2024-01-24 | End: 2024-01-26 | Stop reason: HOSPADM

## 2024-01-24 RX ORDER — OXYCODONE HYDROCHLORIDE 10 MG/1
10 TABLET ORAL EVERY 4 HOURS PRN
Status: DISCONTINUED | OUTPATIENT
Start: 2024-01-24 | End: 2024-01-26 | Stop reason: HOSPADM

## 2024-01-24 RX ORDER — IBUPROFEN 200 MG
24 TABLET ORAL
Status: DISCONTINUED | OUTPATIENT
Start: 2024-01-24 | End: 2024-01-26 | Stop reason: HOSPADM

## 2024-01-24 RX ORDER — HYDROMORPHONE HYDROCHLORIDE 1 MG/ML
0.2 INJECTION, SOLUTION INTRAMUSCULAR; INTRAVENOUS; SUBCUTANEOUS EVERY 5 MIN PRN
Status: DISCONTINUED | OUTPATIENT
Start: 2024-01-24 | End: 2024-01-24 | Stop reason: HOSPADM

## 2024-01-24 RX ORDER — ALBUTEROL SULFATE 90 UG/1
AEROSOL, METERED RESPIRATORY (INHALATION)
Status: DISCONTINUED | OUTPATIENT
Start: 2024-01-24 | End: 2024-01-24

## 2024-01-24 RX ORDER — ROCURONIUM BROMIDE 10 MG/ML
INJECTION, SOLUTION INTRAVENOUS
Status: DISCONTINUED | OUTPATIENT
Start: 2024-01-24 | End: 2024-01-24

## 2024-01-24 RX ORDER — LIDOCAINE HYDROCHLORIDE 10 MG/ML
INJECTION, SOLUTION EPIDURAL; INFILTRATION; INTRACAUDAL; PERINEURAL
Status: DISPENSED
Start: 2024-01-24 | End: 2024-01-24

## 2024-01-24 RX ORDER — CEFAZOLIN SODIUM 1 G/3ML
INJECTION, POWDER, FOR SOLUTION INTRAMUSCULAR; INTRAVENOUS
Status: DISCONTINUED | OUTPATIENT
Start: 2024-01-24 | End: 2024-01-24

## 2024-01-24 RX ADMIN — NALOXONE HYDROCHLORIDE 40 MCG: 0.4 INJECTION, SOLUTION INTRAMUSCULAR; INTRAVENOUS; SUBCUTANEOUS at 11:01

## 2024-01-24 RX ADMIN — ACETAMINOPHEN 1000 MG: 500 TABLET ORAL at 09:01

## 2024-01-24 RX ADMIN — ROCURONIUM BROMIDE 20 MG: 10 INJECTION, SOLUTION INTRAVENOUS at 09:01

## 2024-01-24 RX ADMIN — PHENYLEPHRINE HYDROCHLORIDE 100 MCG: 10 INJECTION INTRAVENOUS at 09:01

## 2024-01-24 RX ADMIN — ONDANSETRON 4 MG: 2 INJECTION INTRAMUSCULAR; INTRAVENOUS at 10:01

## 2024-01-24 RX ADMIN — SUGAMMADEX 200 MG: 100 INJECTION, SOLUTION INTRAVENOUS at 10:01

## 2024-01-24 RX ADMIN — ALBUTEROL SULFATE 10 PUFF: 108 INHALANT RESPIRATORY (INHALATION) at 08:01

## 2024-01-24 RX ADMIN — Medication 20 MG: at 09:01

## 2024-01-24 RX ADMIN — LIDOCAINE 5% 1 PATCH: 700 PATCH TOPICAL at 12:01

## 2024-01-24 RX ADMIN — OXYCODONE HYDROCHLORIDE 10 MG: 10 TABLET ORAL at 03:01

## 2024-01-24 RX ADMIN — METHOCARBAMOL 500 MG: 500 TABLET ORAL at 04:01

## 2024-01-24 RX ADMIN — LIDOCAINE HYDROCHLORIDE 100 MG: 20 INJECTION, SOLUTION EPIDURAL; INFILTRATION; INTRACAUDAL; PERINEURAL at 08:01

## 2024-01-24 RX ADMIN — SODIUM CHLORIDE: 0.9 INJECTION, SOLUTION INTRAVENOUS at 07:01

## 2024-01-24 RX ADMIN — SENNOSIDES AND DOCUSATE SODIUM 1 TABLET: 8.6; 5 TABLET ORAL at 12:01

## 2024-01-24 RX ADMIN — FENTANYL CITRATE 100 MCG: 50 INJECTION, SOLUTION INTRAMUSCULAR; INTRAVENOUS at 08:01

## 2024-01-24 RX ADMIN — DEXAMETHASONE SODIUM PHOSPHATE 4 MG: 4 INJECTION, SOLUTION INTRAMUSCULAR; INTRAVENOUS at 08:01

## 2024-01-24 RX ADMIN — PROPOFOL 200 MG: 10 INJECTION, EMULSION INTRAVENOUS at 08:01

## 2024-01-24 RX ADMIN — POLYETHYLENE GLYCOL 3350 17 G: 17 POWDER, FOR SOLUTION ORAL at 12:01

## 2024-01-24 RX ADMIN — ACETAMINOPHEN 1000 MG: 500 TABLET ORAL at 06:01

## 2024-01-24 RX ADMIN — CEFAZOLIN 2 G: 330 INJECTION, POWDER, FOR SOLUTION INTRAMUSCULAR; INTRAVENOUS at 09:01

## 2024-01-24 RX ADMIN — METHOCARBAMOL 500 MG: 500 TABLET ORAL at 09:01

## 2024-01-24 RX ADMIN — GABAPENTIN 300 MG: 300 CAPSULE ORAL at 09:01

## 2024-01-24 RX ADMIN — CEFAZOLIN 2 G: 2 INJECTION, POWDER, FOR SOLUTION INTRAMUSCULAR; INTRAVENOUS at 04:01

## 2024-01-24 RX ADMIN — IPRATROPIUM BROMIDE AND ALBUTEROL SULFATE 3 ML: .5; 3 SOLUTION RESPIRATORY (INHALATION) at 08:01

## 2024-01-24 RX ADMIN — MIDAZOLAM HYDROCHLORIDE 2 MG: 1 INJECTION INTRAMUSCULAR; INTRAVENOUS at 08:01

## 2024-01-24 RX ADMIN — DEXAMETHASONE SODIUM PHOSPHATE 8 MG: 4 INJECTION, SOLUTION INTRAMUSCULAR; INTRAVENOUS at 08:01

## 2024-01-24 RX ADMIN — SENNOSIDES AND DOCUSATE SODIUM 1 TABLET: 8.6; 5 TABLET ORAL at 09:01

## 2024-01-24 RX ADMIN — SUGAMMADEX 200 MG: 100 INJECTION, SOLUTION INTRAVENOUS at 11:01

## 2024-01-24 RX ADMIN — GABAPENTIN 400 MG: 300 CAPSULE ORAL at 06:01

## 2024-01-24 RX ADMIN — ROCURONIUM BROMIDE 50 MG: 10 INJECTION, SOLUTION INTRAVENOUS at 08:01

## 2024-01-24 RX ADMIN — OXYCODONE HYDROCHLORIDE 10 MG: 10 TABLET ORAL at 09:01

## 2024-01-24 RX ADMIN — METHOCARBAMOL 500 MG: 500 TABLET ORAL at 12:01

## 2024-01-24 RX ADMIN — Medication 30 MG: at 08:01

## 2024-01-24 RX ADMIN — GLYCOPYRROLATE 0.2 MG: 0.2 INJECTION, SOLUTION INTRAMUSCULAR; INTRAVENOUS at 08:01

## 2024-01-24 RX ADMIN — ACETAMINOPHEN 1000 MG: 500 TABLET ORAL at 03:01

## 2024-01-24 RX ADMIN — GABAPENTIN 300 MG: 300 CAPSULE ORAL at 03:01

## 2024-01-24 RX ADMIN — IPRATROPIUM BROMIDE AND ALBUTEROL SULFATE 3 ML: .5; 3 SOLUTION RESPIRATORY (INHALATION) at 12:01

## 2024-01-24 RX ADMIN — ROCURONIUM BROMIDE 30 MG: 10 INJECTION, SOLUTION INTRAVENOUS at 09:01

## 2024-01-24 NOTE — ANESTHESIA RELEASE NOTE
Anesthesia Release from PACU Note    Patient: Mk Gaines    Procedure(s) Performed: Procedure(s) (LRB):  RIGHT VATS WEDGE BIOPSY (Right)    Anesthesia type: general    Post pain: Adequate analgesia    Post assessment: no apparent anesthetic complications    Last Vitals: Visit Vitals  /72 (BP Location: Right arm)   Pulse 94   Temp 36.7 °C (98 °F) (Temporal)   Resp (!) 25   SpO2 95%       Post vital signs: stable    Level of consciousness: awake and alert     Nausea/Vomiting: no nausea/no vomiting    Complications: none    Airway Patency: patent    Respiratory: nasal cannula    Cardiovascular: stable and blood pressure at baseline    Hydration: euvolemic

## 2024-01-24 NOTE — ANESTHESIA PROCEDURE NOTES
Intubation    Date/Time: 1/24/2024 8:43 AM    Performed by: Diego Valdez MD  Authorized by: Matheus Cam MD    Intubation:     Induction:  Intravenous    Intubated:  Postinduction    Mask Ventilation:  Moderately difficult with oral airway    Attempts:  3    Attempted By:  Resident anesthesiologist    Method of Intubation:  Video laryngoscopy    Blade:  Glidescope 3    Laryngeal View Grade: Grade I - full view of cords      Laryngeal View Grade comment:  Double lumen tube difficult to advance through oral cavity due to small mouth. Tracheal cuff sheared on insertion and unable to inflate after COLTON insertion    Attempted By (2nd Attempt):  Staff anesthesiologist    Method of Intubation (2nd Attempt):  Video laryngoscopy    Blade (2nd Attempt):  Glidescope 3    Laryngeal View Grade (2nd Attempt): Grade I - full view of cords      Laryngeal View Grade (2nd Attempt) comment:  Double lumen tube difficult to advance through oral cavity due to small mouth. Tracheal cuff sheared on insertion and unable to inflate after COLTON insertion    Attempted By (3rd Attempt):  Staff anesthesiologist    Method of Intubation (3rd Attempt):  Video laryngoscopy    Blade (3rd Attempt):  Shannon 3    Laryngeal View Grade (3rd Attempt): Grade I - full view of cords      Difficult Airway Encountered?: No      Complications:  Soft tissue trauma and desaturation (clinically insignificant)    Airway Device:  Double lumen tube left    Airway Device Size:  35F    Style/Cuff Inflation:  Cuffed (inflated to minimal occlusive pressure)    Tube secured:  29    Secured at:  The lips    Placement Verified By:  Capnometry and other (see comments) (visualization of correct placement with fiberoptic bronchoscope)    Complicating Factors:  Small mouth, obesity, short neck, poor neck/head extension and oropharyngeal edema or fat    Findings Post-Intubation:  BS equal bilateral and atraumatic/condition of teeth unchanged  Notes:      Patient had a  very small mouth and sharp incisors which made placement of 35 Icelandic COLTON difficult. Tracheal cuff was sheared when passing patient's sharp incisors (one attempt by resident and one attempt by staff with Glidescope S3 and 35F COLTON). Staff physician successfully placed 35F COLTON on 3rd attempt using Shannon 3 blade.

## 2024-01-24 NOTE — PROGRESS NOTES
1205 Chest Xray completed  1230 Attempted to wean patient to room air unable to wean, breathing treatment completed. After treatment placed on 3 LPM NC.   1315 Attempt to wean NC to 2 LPM unable to patient, desat to 86%-87%. PACU Anesthesia resident GYPSY Li MD called, will monitor patient in PACU and try wean in 30-45 minutes. Patient continue on 3 Lpm NC

## 2024-01-24 NOTE — OP NOTE
Operative Report     Date: 1/24/2024    Physician: Surgeon(s) and Role:     * Boogie Alva MD - Primary     * Bri Lindo MD - Resident - Assisting     * Barrie Marquez DO - Fellow    PREOPERATIVE DIAGNOSES:   ILD (interstitial lung disease) [J84.9]    POSTOPERATIVE DIAGNOSES:   ILD (interstitial lung disease) [J84.9]    Procedure: RIGHT VATS WEDGE BIOPSY (Right)   Intercostal Nerve Block 4 levels     Anesthesia: General    Operative Findings: Petechiae of the visceral pleura suggestive of chronic inflammation. Friable lung that became edematous with minimal manipulation. Elevated right hemidiaphragm.       PROCEDURES PERFORMED:     Flexible Bronchoscopy     Thoracoscopic (VATS) Wedge Resection  of lung- right upper lobe, right middle lobe, and right lower lobe    Intercostal nerve block, three or more levels     Indications:  Mk aGines is a 53 y.o. male  with a diagnosis of interstitial lung disease who presents for VATs with wedge biopsy for diagnostic purposes. He was previously seen in clinic where the procedure was discussed. Informed consent was obtained.     Operation Detail:    After surgical time out and general understanding of the nature and laterality of the operative side was established, the patient underwent adequate anesthesia for bronchoscopy utilizing endotracheal intubation. Fiberoptic Bronchoscopy was the performed introducing the bronchoscope through the endotracheal tube. Bronchoscopy revealed  significant tracheal edema with secretions.  There were no masses or other abnormal findings.     Endotracheal tube ventilation was established and selective ventilation was then accomplished with double lumen tube. Correct positioning and function was confirmed bronchoscopically.     The patient was positioned in a left lateral decubitus position. The anticipated operative site was then prepared sterilely and the operative field appropriately draped sterilely. Single lung ventilation was  established to the opposite lung and collapse of the ipsilateral lung assured for the thoracoscopic procedure. An incision was made in the 6-7th rib space and dissection was carried down to the pleura which was entered under direct visualization after an apneic pause in ventilation. A 10 mm trocar was inserted and the thoracoscope was inserted. There were no adhesions noted. An elevated hemidiaphragm did make visualization difficult initially. The lung was examined. Lung exploration revealed no gross lesions, although the visceral pleura was populated with petechiae suggestive of chronic inflammation. The lung tissue was quite friable and edematous.  We then performed biopsy wedge resection of the right upper, middle, and lower lobe. An endo-STEFANO stapler was utilized with the purple load for each wedge resection.     An intercostal nerve block was performed between interspaces 4-8. Chest cavity drainage was accomplished with a single chest tube of 24  Tunisian circumference which was secured at the skin after proper intra-thoracic positioning.  The chest tube(s) were then connected to the underwater seal thoracic drainage system.     The chest wall nd lung parenchyma was then carefully assessed for hemostasis and pneumostasis, which was assured.  The lung was then expanded prior to rib approximation. The chest wall musculature, the chest wall subcutaneous tissues, and the skin were then re-approximated in the standard manner.     Sterile dressings were applied. The patient was extubated in the operating room and transported to the recovery room in stable condition. All sponge, instrument, and needle counts were reported as correct at the end of the procedure.    Estimated Blood Loss: 20 cc    Complications: None    Drains: 24 Y[place]ut chest tube    Specimen:   Specimen (24h ago, onward)          Start     Ordered     01/24/24 1023   Specimen to Pathology, Surgery Pulmonary and Thoracic  Once        Comments: Pre-op  Diagnosis: ILD (interstitial lung disease) [J84.9]Procedure(s):RIGHT VATS WEDGE BIOPSY Number of specimens: 3Name of specimens: 1. RUL wedge - permanent2. RML wedge - permanent3. RLL wedge - permanent      References:    Click here for ordering Quick Tip   Question Answer Comment   Procedure Type: Pulmonary and Thoracic     Specimen Class: Routine/Screening     Which provider would you like to cc? SHAYNE VEGA     Release to patient Immediate         01/24/24 1024     01/24/24 0957   Cytology, Fluid/Wash/Brush  Once        Comments: Right lung      Question Answer Comment   Source: Pleural Fluid     Clinical Information: ILD (interstitial lung disease)     Specific Site: see text     Other Requests: n/a     Release to patient Immediate

## 2024-01-24 NOTE — ANESTHESIA POSTPROCEDURE EVALUATION
Anesthesia Post Evaluation    Patient: Mk Gaines    Procedure(s) Performed: Procedure(s) (LRB):  RIGHT VATS WEDGE BIOPSY (Right)    Final Anesthesia Type: general      Patient location during evaluation: PACU  Patient participation: Yes- Able to Participate  Level of consciousness: awake and alert  Post-procedure vital signs: reviewed and stable  Pain management: adequate  Airway patency: patent    PONV status at discharge: No PONV  Anesthetic complications: no      Cardiovascular status: hemodynamically stable  Respiratory status: unassisted  Hydration status: euvolemic  Follow-up not needed.              Vitals Value Taken Time   /72 01/24/24 1500   Temp 36.3 °C (97.4 °F) 01/24/24 1500   Pulse 89 01/24/24 1500   Resp 18 01/24/24 1517   SpO2 93 % 01/24/24 1500         Event Time   Out of Recovery 14:00:00         Pain/Marline Score: Pain Rating Prior to Med Admin: 8 (1/24/2024  3:17 PM)  Marline Score: 9 (1/24/2024  2:00 PM)

## 2024-01-24 NOTE — H&P
[facility] (2nd Fl)  History & Physical  Cardiothoracic Surgery    SUBJECTIVE:     History of Present Illness:  Patient is a 53 y.o. male never smoker with HTN, IFG, obesity (BMI: 37.9) who is referred by Pulmonology, Dr. Pastor, evaluation of ILD and consideration for VATS wedge biopsy. Chest CT 12/27/23 shows reactive airspace disease (air trapping) as well as possible early interstitial lung disease with regions of bronchiectasis, pleuroparenchymal scarring and interlobular septal thickening. PFTs show restriction; however, FEV1 70%. Rheumatologic work-up normal.      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 (Patient taking differently: Take 10 mg by mouth every evening.)    losartan (COZAAR) 50 MG tablet Take 1 tablet (50 mg total) by mouth once daily.    metFORMIN (GLUCOPHAGE-XR) 500 MG ER 24hr tablet Take 2 tablets (1,000 mg total) by mouth daily with breakfast. Start with 1 tablet daily 1 week. Then go up to 2 tablets daily       Review of patient's allergies indicates:   Allergen Reactions    Thiazides Other (See Comments)     Hyponatremia        Past Medical History:   Diagnosis Date    Diabetes     Hypertension      History reviewed. No pertinent surgical history.    Social History     Tobacco Use    Smoking status: Never    Smokeless tobacco: Never   Substance Use Topics    Alcohol use: Yes        Review of Systems:  Constitutional: no fever or chills  Respiratory: no cough or shortness of breath  Cardiovascular: no chest pain or palpitations  Gastrointestinal: no nausea or vomiting, tolerating diet  Hematologic/Lymphatic: no easy bruising  or lymphadenopathy  Musculoskeletal: no arthralgias or myalgias  Neurological: no seizures or tremors  Behavioral/Psych: no auditory or visual hallucinations    OBJECTIVE:     Vital Signs (Most Recent):       Admission:     Most Recent:      Physical Exam:  General: no distress  Head: normocephalic  Eyes:  conjunctivae/corneas clear.  Neck: supple, symmetrical, trachea midline  Lungs:  normal respiratory effort  Heart: regular rate and rhythm  Abdomen: non-distended   Extremities: warm, well perfused and no cyanosis or edema, or clubbing  Skin: Skin color, texture, turgor normal. No rashes or lesions  Neurologic: Alert and oriented. Thought content appropriate    Laboratory:  I have reviewed all pertinent lab results within the past 24 hours.    Diagnostic Results:  All pertinent diagnostic imaging has been reviewed      ASSESSMENT/PLAN:     53 y.o. male never smoker with HTN, IFG, obesity (BMI: 37.9) who is referred by Pulmonology, Dr. Pastor for evaluation of ILD.    To OR for right VATS wedge biopsy      Barrie Marquez DO  Cardiothoracic Surgery Fellow

## 2024-01-24 NOTE — NURSING
Pt & family arrived on unit.  Pt & family oriented to room, chest tube connected to suction, & pt placed on 2 LNC.  Vitals taken and assessment performed.

## 2024-01-24 NOTE — BRIEF OP NOTE
[facility] ([facility])  Brief Operative Note    SUMMARY     Surgery Date: 1/24/2024     Surgeon(s) and Role:     * Boogie Vega MD - Primary     * Bri Lindo MD - Resident - Assisting    Pre-op Diagnosis:  ILD (interstitial lung disease) [J84.9]    Post-op Diagnosis:  Post-Op Diagnosis Codes:     * ILD (interstitial lung disease) [J84.9]    Procedure(s) (LRB):  RIGHT VATS WEDGE BIOPSY (Right)    Anesthesia: General    Implants:  * No implants in log *    Operative Findings: Petechiae of the visceral pleura suggestive of chronic inflammation. Friable lung that became edematous with minimal manipulation. Elevated right hemidiaphragm.    Estimated Blood Loss: 10 cc           Specimens:   Specimen (24h ago, onward)       Start     Ordered    01/24/24 1023  Specimen to Pathology, Surgery Pulmonary and Thoracic  Once        Comments: Pre-op Diagnosis: ILD (interstitial lung disease) [J84.9]Procedure(s):RIGHT VATS WEDGE BIOPSY Number of specimens: 3Name of specimens: 1. RUL wedge - permanent2. RML wedge - permanent3. RLL wedge - permanent     References:    Click here for ordering Quick Tip   Question Answer Comment   Procedure Type: Pulmonary and Thoracic    Specimen Class: Routine/Screening    Which provider would you like to cc? BOOGIE VEGA    Release to patient Immediate        01/24/24 1024    01/24/24 0957  Cytology, Fluid/Wash/Brush  Once        Comments: Right lung     Question Answer Comment   Source: Pleural Fluid    Clinical Information: ILD (interstitial lung disease)    Specific Site: see text    Other Requests: n/a    Release to patient Immediate        01/24/24 1000                    [id number]

## 2024-01-24 NOTE — NURSING TRANSFER
Nursing Transfer Note      1/24/2024   2:57 PM    Nurse giving handoff:Kassidy RN PACU  Nurse receiving handoff:Cherelle BISWAS    Reason patient is being transferred: MD order    Transfer To: 1004    Transfer via bed    Transfer with  to O2    Transported by RN     Order for Tele Monitor? No    Additional Lines: Oxygen and Chest Tube    Any special needs or follow-up needed: Patient weaned to 2 LMP    Patient belongings transferred with patient: No    Chart send with patient: Yes    Notified: spouse, son    Patient reassessed at: 1400 (date, time)  1  Upon arrival to floor: call bell in reach and bed in lowest position

## 2024-01-24 NOTE — TRANSFER OF CARE
Anesthesia Transfer of Care Note    Patient: Mk Gaines    Procedure(s) Performed: Procedure(s) (LRB):  RIGHT VATS WEDGE BIOPSY (Right)    Patient location: PACU    Anesthesia Type: general    Transport from OR: Transported from OR on 6-10 L/min O2 by face mask with adequate spontaneous ventilation    Post pain: adequate analgesia    Post assessment: no apparent anesthetic complications    Post vital signs: stable    Level of consciousness: lethargic and responds to stimulation (lethargic but responds to commands)    Nausea/Vomiting: no nausea/vomiting    Complications: none    Transfer of care protocol was followed      Last vitals: Visit Vitals  BP (!) 175/87   Pulse 92   Temp 36.4 °C (97.5 °F) (Temporal)   Resp 18   SpO2 97%

## 2024-01-24 NOTE — PLAN OF CARE
Chart reviewed. Preop nursing care completed per orders. Safe surgery checklist complete aside from anesthesia consent. Type and screen/ABO drawn and sent to blood bank. Son at bedside and to take belongings. Pt requested to use son for nursing care translation and video  to speak with physicians. Video interpreting monitor at bedside. Call bell within reach. Instructed pt to call for assistance.

## 2024-01-25 DIAGNOSIS — J84.9 ILD (INTERSTITIAL LUNG DISEASE): Primary | ICD-10-CM

## 2024-01-25 LAB
ALBUMIN SERPL BCP-MCNC: 3.6 G/DL (ref 3.5–5.2)
ALLENS TEST: ABNORMAL
ALLENS TEST: ABNORMAL
ALLENS TEST: NORMAL
ALP SERPL-CCNC: 68 U/L (ref 55–135)
ALT SERPL W/O P-5'-P-CCNC: 23 U/L (ref 10–44)
ANION GAP SERPL CALC-SCNC: 7 MMOL/L (ref 8–16)
AST SERPL-CCNC: 27 U/L (ref 10–40)
BASOPHILS # BLD AUTO: 0.01 K/UL (ref 0–0.2)
BASOPHILS NFR BLD: 0.1 % (ref 0–1.9)
BILIRUB SERPL-MCNC: 0.4 MG/DL (ref 0.1–1)
BUN SERPL-MCNC: 16 MG/DL (ref 6–20)
CALCIUM SERPL-MCNC: 9.5 MG/DL (ref 8.7–10.5)
CHLORIDE SERPL-SCNC: 99 MMOL/L (ref 95–110)
CO2 SERPL-SCNC: 27 MMOL/L (ref 23–29)
CREAT SERPL-MCNC: 0.8 MG/DL (ref 0.5–1.4)
CREAT SERPL-MCNC: 0.8 MG/DL (ref 0.5–1.4)
DELSYS: ABNORMAL
DELSYS: NORMAL
DIFFERENTIAL METHOD BLD: ABNORMAL
EOSINOPHIL # BLD AUTO: 0 K/UL (ref 0–0.5)
EOSINOPHIL NFR BLD: 0 % (ref 0–8)
ERYTHROCYTE [DISTWIDTH] IN BLOOD BY AUTOMATED COUNT: 12.6 % (ref 11.5–14.5)
EST. GFR  (NO RACE VARIABLE): >60 ML/MIN/1.73 M^2
EST. GFR  (NO RACE VARIABLE): >60 ML/MIN/1.73 M^2
FLOW: 5
FLOW: 5
GLUCOSE SERPL-MCNC: 172 MG/DL (ref 70–110)
HCO3 UR-SCNC: 30.6 MMOL/L (ref 24–28)
HCO3 UR-SCNC: 32.1 MMOL/L (ref 24–28)
HCT VFR BLD AUTO: 42 % (ref 40–54)
HCT VFR BLD CALC: 41 %PCV (ref 36–54)
HCT VFR BLD CALC: 47 %PCV (ref 36–54)
HGB BLD-MCNC: 13.6 G/DL (ref 14–18)
IMM GRANULOCYTES # BLD AUTO: 0.03 K/UL (ref 0–0.04)
IMM GRANULOCYTES NFR BLD AUTO: 0.3 % (ref 0–0.5)
LDH SERPL L TO P-CCNC: 1.01 MMOL/L (ref 0.36–1.25)
LYMPHOCYTES # BLD AUTO: 0.6 K/UL (ref 1–4.8)
LYMPHOCYTES NFR BLD: 5 % (ref 18–48)
MAGNESIUM SERPL-MCNC: 2.2 MG/DL (ref 1.6–2.6)
MCH RBC QN AUTO: 30.4 PG (ref 27–31)
MCHC RBC AUTO-ENTMCNC: 32.4 G/DL (ref 32–36)
MCV RBC AUTO: 94 FL (ref 82–98)
MODE: ABNORMAL
MODE: NORMAL
MONOCYTES # BLD AUTO: 0.6 K/UL (ref 0.3–1)
MONOCYTES NFR BLD: 5.6 % (ref 4–15)
NEUTROPHILS # BLD AUTO: 10.2 K/UL (ref 1.8–7.7)
NEUTROPHILS NFR BLD: 89 % (ref 38–73)
NRBC BLD-RTO: 0 /100 WBC
PCO2 BLDA: 70.9 MMHG (ref 35–45)
PCO2 BLDA: 81 MMHG (ref 35–45)
PH SMN: 7.18 [PH] (ref 7.35–7.45)
PH SMN: 7.26 [PH] (ref 7.35–7.45)
PHOSPHATE SERPL-MCNC: 4 MG/DL (ref 2.7–4.5)
PLATELET # BLD AUTO: 265 K/UL (ref 150–450)
PMV BLD AUTO: 10.6 FL (ref 9.2–12.9)
PO2 BLDA: 74 MMHG (ref 80–100)
PO2 BLDA: 95 MMHG (ref 40–60)
POC BE: 2 MMOL/L
POC BE: 5 MMOL/L
POC IONIZED CALCIUM: 1.24 MMOL/L (ref 1.06–1.42)
POC IONIZED CALCIUM: 1.26 MMOL/L (ref 1.06–1.42)
POC SATURATED O2: 92 % (ref 95–100)
POC SATURATED O2: 95 % (ref 95–100)
POC TCO2: 33 MMOL/L (ref 24–29)
POC TCO2: 34 MMOL/L (ref 23–27)
POCT GLUCOSE: 116 MG/DL (ref 70–110)
POCT GLUCOSE: 140 MG/DL (ref 70–110)
POCT GLUCOSE: 156 MG/DL (ref 70–110)
POCT GLUCOSE: 176 MG/DL (ref 70–110)
POCT GLUCOSE: 210 MG/DL (ref 70–110)
POTASSIUM BLD-SCNC: 4.9 MMOL/L (ref 3.5–5.1)
POTASSIUM BLD-SCNC: 5.8 MMOL/L (ref 3.5–5.1)
POTASSIUM SERPL-SCNC: 4.9 MMOL/L (ref 3.5–5.1)
PROT SERPL-MCNC: 7.4 G/DL (ref 6–8.4)
RBC # BLD AUTO: 4.48 M/UL (ref 4.6–6.2)
SAMPLE: ABNORMAL
SAMPLE: ABNORMAL
SAMPLE: NORMAL
SITE: ABNORMAL
SITE: ABNORMAL
SITE: NORMAL
SODIUM BLD-SCNC: 134 MMOL/L (ref 136–145)
SODIUM BLD-SCNC: 135 MMOL/L (ref 136–145)
SODIUM SERPL-SCNC: 133 MMOL/L (ref 136–145)
SP02: 93
SP02: 95
WBC # BLD AUTO: 11.46 K/UL (ref 3.9–12.7)

## 2024-01-25 PROCEDURE — 25000242 PHARM REV CODE 250 ALT 637 W/ HCPCS: Performed by: STUDENT IN AN ORGANIZED HEALTH CARE EDUCATION/TRAINING PROGRAM

## 2024-01-25 PROCEDURE — 11000001 HC ACUTE MED/SURG PRIVATE ROOM

## 2024-01-25 PROCEDURE — 94640 AIRWAY INHALATION TREATMENT: CPT

## 2024-01-25 PROCEDURE — 36600 WITHDRAWAL OF ARTERIAL BLOOD: CPT

## 2024-01-25 PROCEDURE — 99291 CRITICAL CARE FIRST HOUR: CPT | Mod: ,,, | Performed by: ANESTHESIOLOGY

## 2024-01-25 PROCEDURE — 0BBC4ZX EXCISION OF RIGHT UPPER LUNG LOBE, PERCUTANEOUS ENDOSCOPIC APPROACH, DIAGNOSTIC: ICD-10-PCS | Performed by: STUDENT IN AN ORGANIZED HEALTH CARE EDUCATION/TRAINING PROGRAM

## 2024-01-25 PROCEDURE — 80053 COMPREHEN METABOLIC PANEL: CPT | Performed by: STUDENT IN AN ORGANIZED HEALTH CARE EDUCATION/TRAINING PROGRAM

## 2024-01-25 PROCEDURE — 27000221 HC OXYGEN, UP TO 24 HOURS

## 2024-01-25 PROCEDURE — 82803 BLOOD GASES ANY COMBINATION: CPT

## 2024-01-25 PROCEDURE — 63600175 PHARM REV CODE 636 W HCPCS: Performed by: STUDENT IN AN ORGANIZED HEALTH CARE EDUCATION/TRAINING PROGRAM

## 2024-01-25 PROCEDURE — 0BBF4ZX EXCISION OF RIGHT LOWER LUNG LOBE, PERCUTANEOUS ENDOSCOPIC APPROACH, DIAGNOSTIC: ICD-10-PCS | Performed by: STUDENT IN AN ORGANIZED HEALTH CARE EDUCATION/TRAINING PROGRAM

## 2024-01-25 PROCEDURE — 36415 COLL VENOUS BLD VENIPUNCTURE: CPT | Performed by: STUDENT IN AN ORGANIZED HEALTH CARE EDUCATION/TRAINING PROGRAM

## 2024-01-25 PROCEDURE — 3E0T3BZ INTRODUCTION OF ANESTHETIC AGENT INTO PERIPHERAL NERVES AND PLEXI, PERCUTANEOUS APPROACH: ICD-10-PCS | Performed by: STUDENT IN AN ORGANIZED HEALTH CARE EDUCATION/TRAINING PROGRAM

## 2024-01-25 PROCEDURE — 25000003 PHARM REV CODE 250: Performed by: SURGERY

## 2024-01-25 PROCEDURE — 83605 ASSAY OF LACTIC ACID: CPT

## 2024-01-25 PROCEDURE — 27000190 HC CPAP FULL FACE MASK W/VALVE

## 2024-01-25 PROCEDURE — 82565 ASSAY OF CREATININE: CPT

## 2024-01-25 PROCEDURE — 94660 CPAP INITIATION&MGMT: CPT

## 2024-01-25 PROCEDURE — 85014 HEMATOCRIT: CPT

## 2024-01-25 PROCEDURE — 83735 ASSAY OF MAGNESIUM: CPT | Performed by: STUDENT IN AN ORGANIZED HEALTH CARE EDUCATION/TRAINING PROGRAM

## 2024-01-25 PROCEDURE — 84295 ASSAY OF SERUM SODIUM: CPT

## 2024-01-25 PROCEDURE — 94799 UNLISTED PULMONARY SVC/PX: CPT | Mod: XB

## 2024-01-25 PROCEDURE — 84132 ASSAY OF SERUM POTASSIUM: CPT

## 2024-01-25 PROCEDURE — 82330 ASSAY OF CALCIUM: CPT

## 2024-01-25 PROCEDURE — 5A09357 ASSISTANCE WITH RESPIRATORY VENTILATION, LESS THAN 24 CONSECUTIVE HOURS, CONTINUOUS POSITIVE AIRWAY PRESSURE: ICD-10-PCS | Performed by: STUDENT IN AN ORGANIZED HEALTH CARE EDUCATION/TRAINING PROGRAM

## 2024-01-25 PROCEDURE — 25000003 PHARM REV CODE 250: Performed by: STUDENT IN AN ORGANIZED HEALTH CARE EDUCATION/TRAINING PROGRAM

## 2024-01-25 PROCEDURE — 63600175 PHARM REV CODE 636 W HCPCS

## 2024-01-25 PROCEDURE — 0BBD4ZX EXCISION OF RIGHT MIDDLE LUNG LOBE, PERCUTANEOUS ENDOSCOPIC APPROACH, DIAGNOSTIC: ICD-10-PCS | Performed by: STUDENT IN AN ORGANIZED HEALTH CARE EDUCATION/TRAINING PROGRAM

## 2024-01-25 PROCEDURE — 85025 COMPLETE CBC W/AUTO DIFF WBC: CPT | Performed by: STUDENT IN AN ORGANIZED HEALTH CARE EDUCATION/TRAINING PROGRAM

## 2024-01-25 PROCEDURE — 63600175 PHARM REV CODE 636 W HCPCS: Performed by: SURGERY

## 2024-01-25 PROCEDURE — 27100171 HC OXYGEN HIGH FLOW UP TO 24 HOURS

## 2024-01-25 PROCEDURE — 99900035 HC TECH TIME PER 15 MIN (STAT)

## 2024-01-25 PROCEDURE — 84100 ASSAY OF PHOSPHORUS: CPT | Performed by: STUDENT IN AN ORGANIZED HEALTH CARE EDUCATION/TRAINING PROGRAM

## 2024-01-25 PROCEDURE — 94761 N-INVAS EAR/PLS OXIMETRY MLT: CPT | Mod: XB

## 2024-01-25 RX ORDER — FUROSEMIDE 10 MG/ML
40 INJECTION INTRAMUSCULAR; INTRAVENOUS ONCE
Status: COMPLETED | OUTPATIENT
Start: 2024-01-25 | End: 2024-01-25

## 2024-01-25 RX ORDER — METHOCARBAMOL 500 MG/1
500 TABLET, FILM COATED ORAL 3 TIMES DAILY
Status: DISCONTINUED | OUTPATIENT
Start: 2024-01-25 | End: 2024-01-26 | Stop reason: HOSPADM

## 2024-01-25 RX ORDER — KETOROLAC TROMETHAMINE 15 MG/ML
15 INJECTION, SOLUTION INTRAMUSCULAR; INTRAVENOUS EVERY 8 HOURS
Status: DISCONTINUED | OUTPATIENT
Start: 2024-01-25 | End: 2024-01-26

## 2024-01-25 RX ADMIN — METHOCARBAMOL 500 MG: 500 TABLET ORAL at 08:01

## 2024-01-25 RX ADMIN — CEFAZOLIN 2 G: 2 INJECTION, POWDER, FOR SOLUTION INTRAMUSCULAR; INTRAVENOUS at 01:01

## 2024-01-25 RX ADMIN — IPRATROPIUM BROMIDE AND ALBUTEROL SULFATE 3 ML: .5; 3 SOLUTION RESPIRATORY (INHALATION) at 01:01

## 2024-01-25 RX ADMIN — ACETAMINOPHEN 1000 MG: 500 TABLET ORAL at 09:01

## 2024-01-25 RX ADMIN — GABAPENTIN 300 MG: 300 CAPSULE ORAL at 08:01

## 2024-01-25 RX ADMIN — IPRATROPIUM BROMIDE AND ALBUTEROL SULFATE 3 ML: .5; 3 SOLUTION RESPIRATORY (INHALATION) at 02:01

## 2024-01-25 RX ADMIN — SENNOSIDES AND DOCUSATE SODIUM 1 TABLET: 8.6; 5 TABLET ORAL at 09:01

## 2024-01-25 RX ADMIN — METHOCARBAMOL 500 MG: 500 TABLET ORAL at 09:01

## 2024-01-25 RX ADMIN — IPRATROPIUM BROMIDE AND ALBUTEROL SULFATE 3 ML: .5; 3 SOLUTION RESPIRATORY (INHALATION) at 07:01

## 2024-01-25 RX ADMIN — KETOROLAC TROMETHAMINE 15 MG: 15 INJECTION, SOLUTION INTRAMUSCULAR; INTRAVENOUS at 03:01

## 2024-01-25 RX ADMIN — ACETAMINOPHEN 1000 MG: 500 TABLET ORAL at 05:01

## 2024-01-25 RX ADMIN — SENNOSIDES AND DOCUSATE SODIUM 1 TABLET: 8.6; 5 TABLET ORAL at 08:01

## 2024-01-25 RX ADMIN — POLYETHYLENE GLYCOL 3350 17 G: 17 POWDER, FOR SOLUTION ORAL at 09:01

## 2024-01-25 RX ADMIN — KETOROLAC TROMETHAMINE 15 MG: 15 INJECTION, SOLUTION INTRAMUSCULAR; INTRAVENOUS at 09:01

## 2024-01-25 RX ADMIN — LIDOCAINE 5% 1 PATCH: 700 PATCH TOPICAL at 11:01

## 2024-01-25 RX ADMIN — FUROSEMIDE 40 MG: 10 INJECTION, SOLUTION INTRAVENOUS at 07:01

## 2024-01-25 RX ADMIN — ENOXAPARIN SODIUM 40 MG: 40 INJECTION SUBCUTANEOUS at 05:01

## 2024-01-25 RX ADMIN — OXYCODONE HYDROCHLORIDE 5 MG: 5 TABLET ORAL at 11:01

## 2024-01-25 NOTE — CARE UPDATE
"RAPID RESPONSE NURSE PROACTIVE ROUNDING NOTE       Time of Visit: 0118    Admit Date: 2024  LOS: 1  Code Status: Prior   Date of Visit: 2024  : 1970  Age: 53 y.o.  Sex: male  Race: Other  Bed: [facility] A:   MRN: [id number]  Was the patient discharged from an ICU this admission? No   Was the patient discharged from a PACU within last 24 hours? Yes   Did the patient receive conscious sedation/general anesthesia in last 24 hours? No  Was the patient in the ED within the past 24 hours? No  Was the patient on NIPPV within the past 24 hours? No   Attending Physician: Boogie Alva MD  Primary Service: Cardiothoracic Surgery   Time spent at the bedside: 30 - 45 min    SITUATION    Notified by  respiratory therapy .  Reason for alert: decreased responsiveness  Called to evaluate the patient for Neuro    BACKGROUND     Why is the patient in the hospital?: <principal problem not specified>    Patient has a past medical history of Diabetes and Hypertension.    Last Vitals:  Temp: 98.4 °F (36.9 °C) (125)  Pulse: 104 (143)  Resp: 16 (125)  BP: 137/94 (125)  SpO2: 95 % (125)    24 Hours Vitals Range:  Temp:  [97.4 °F (36.3 °C)-98.8 °F (37.1 °C)]   Pulse:  []   Resp:  [16-45]   BP: (106-175)/(66-94)   SpO2:  [90 %-100 %]     Labs:  Recent Labs     24   HCT 47       No results for input(s): "NA", "K", "CL", "CO2", "BUN", "CREATININE", "GLU", "PHOS", "MG" in the last 72 hours.    Invalid input(s): "CMP", "TBIL"     Recent Labs     24   PH 7.185*   PCO2 81.0*   PO2 95*   HCO3 30.6*   POCSATURATED 95   BE 2        ASSESSMENT    Physical Exam    INTERVENTIONS    The patient was seen for Neurological problem. Staff concerns included decreased responsiveness. The following interventions were performed: POCT glucose and ABG, CXR, Bipap.    RECOMMENDATIONS      Patient lethargic/drowsy on initial exam. Receiving breathing treatment. Patient is [place]ian " speaking able to follow commands and answer questions with help of son at bedside. Hemodynamically stable, slightly tachycardic. Respirations rate elevated, breaths shallow. Decreased breaths sounds and wheezing noted on auscultation. Normal heart tones, heart rate regular. Patient c/o 4/10 chest pain at rest and 8/10 with cough. Chest tube assessed. Site and surgical incisions intact. Chest tube to suction no air leak noted on exam.   Chest Xray performed. ABG performed significant for non-compensated respiratory acidosis.  Patient placed on minimal bipap settings 10/5 x 4 hours. Reassess blood gas in 4 hours.   Monitor intake and output and document results. Encourage IS, acapella and pulmonary hygiene. Monitor breath sounds and chest tube. Notify MD immediately if air leak develops and/or patient decompensates.    PROVIDER ESCALATION    Yes/No  Yes    Orders received and case discussed with Reza Torres .    Disposition: Remain in room 1009.    FOLLOW-UP    Charge Bakari HURTADO  updated on plan of care. Instructed to call the Rapid Response Nurse, Elana Edmondson RN at [phone] for additional questions or concerns.

## 2024-01-25 NOTE — H&P
[facility]  Critical Care - Surgery  History & Physical    Patient Name: Mk Gaines  MRN: [id number]  Admission Date: 1/24/2024  Code Status: Full Code  Attending Physician: Boogie Alva MD   Primary Care Provider: Ambrocio Olvera MD   Principal Problem: <principal problem not specified>    Subjective:     HPI:  53 y.o. male never smoker with HTN, IFG, obesity (BMI: 37.9) who is referred by Pulmonology, Dr. Pastor, evaluation of interstitial lung disease sp VATS wedge biopsy 1/24. Chest CT 12/27/23 shows reactive airspace disease (air trapping) as well as possible early interstitial lung disease with regions of bronchiectasis, pleuroparenchymal scarring and interlobular septal thickening. PFTs show restriction; however, FEV1 70%. Rheumatologic work-up normal. 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 His home medications include amlodipine, losartan, metformin.    Around 0100 on 1/25/24 rapid response called for altered mentation and SpO2 Low 90s. ABG showing hypercapnic respiratory acidosis w/ pCO2 80. Patient placed on BiPAP 10/5 40% FiO2 and stepped up to SICU for cont BiPAP. On arrival to SICU patient is on BiPAP satting 100%. BiPAP transitioned to 5L NC and patient observed for several minutes during interview. O2 stable throughout conversation on 5L NC. Son at bedside assisting with translation. Patient denies pain and states that he is feeling like normal again other than being tired from being awake all night. Surgical incision is c/d/I. No other wounds skin breakdown on exposure. Lung still appear under expanded on CXR, but CXR at 0500 is improved when compared to 0100 CXR.    Hospital/ICU Course:  No notes on file    Follow-up For: Procedure(s) (LRB):  RIGHT VATS WEDGE BIOPSY (Right)    Post-Operative Day: 1 Day Post-Op     Past Medical History:   Diagnosis Date    Diabetes     Hypertension        Past Surgical History:   Procedure Laterality Date     VIDEO-ASSISTED THORACOSCOPIC SURGERY (VATS) Right 1/24/2024    Procedure: RIGHT VATS WEDGE BIOPSY;  Surgeon: Boogie Alva MD;  Location: [facility] OR [address] [place] [address];  Service: Cardiothoracic;  Laterality: Right;       Review of patient's allergies indicates:   Allergen Reactions    Thiazides Other (See Comments)     Hyponatremia        Family History       Problem Relation (Age of Onset)    Diabetes Mother    Hypertension Mother    No Known Problems Father          Tobacco Use    Smoking status: Never    Smokeless tobacco: Never   Substance and Sexual Activity    Alcohol use: Yes    Drug use: Not on file    Sexual activity: Not on file      Review of Systems   Constitutional:  Negative for fever.   HENT:  Negative for congestion.    Respiratory:  Negative for shortness of breath.    Cardiovascular:  Negative for chest pain.   Gastrointestinal:  Negative for abdominal pain and nausea.     Objective:     Vital Signs (Most Recent):  Temp: 98.4 °F (36.9 °C) (01/25/24 0700)  Pulse: 89 (01/25/24 0743)  Resp: 19 (01/25/24 0743)  BP: (!) 143/94 (01/25/24 0700)  SpO2: (!) 94 % (01/25/24 0743) Vital Signs (24h Range):  Temp:  [97.4 °F (36.3 °C)-99.2 °F (37.3 °C)] 98.4 °F (36.9 °C)  Pulse:  [] 89  Resp:  [16-45] 19  SpO2:  [90 %-100 %] 94 %  BP: (106-175)/(66-94) 143/94     Weight: 102.1 kg (225 lb 1.4 oz)  Body mass index is 39.87 kg/m².      Intake/Output Summary (Last 24 hours) at 1/25/2024 0751  Last data filed at 1/25/2024 0733  Gross per 24 hour   Intake 1094.64 ml   Output 139 ml   Net 955.64 ml          Physical Exam  Vitals and nursing note reviewed.   Constitutional:       General: He is not in acute distress.     Appearance: He is obese. He is not ill-appearing.   HENT:      Head: Normocephalic.      Mouth/Throat:      Mouth: Mucous membranes are moist.      Pharynx: Oropharynx is clear.   Eyes:      General: No scleral icterus.     Extraocular Movements: Extraocular movements intact.      Conjunctiva/sclera:  Conjunctivae normal.   Cardiovascular:      Rate and Rhythm: Normal rate.      Pulses: Normal pulses.   Pulmonary:      Effort: Pulmonary effort is normal. No respiratory distress.      Breath sounds: No wheezing.      Comments: R chest tube in place, to water seal, no leak    5L nasal cannula  Abdominal:      General: There is no distension.      Palpations: Abdomen is soft.      Comments: Round abdomen   Musculoskeletal:         General: No swelling or tenderness. Normal range of motion.      Cervical back: Normal range of motion.   Skin:     General: Skin is warm and dry.      Coloration: Skin is not jaundiced or pale.   Neurological:      General: No focal deficit present.      Mental Status: He is alert and oriented to person, place, and time.   Psychiatric:         Mood and Affect: Mood normal.            Vents:  Oxygen Concentration (%): 50 (01/25/24 0608)    Lines/Drains/Airways       Drain  Duration                  Chest Tube 01/24/24 1031 Tube - 1 Right 24 Fr. <1 day              Peripheral Intravenous Line  Duration                  Peripheral IV - Single Lumen 01/24/24 0635 18 G Left;Posterior Forearm 1 day         Peripheral IV - Single Lumen 01/24/24 0844 18 G Right;Anterior Forearm <1 day                    Significant Labs:    CBC/Anemia Profile:  Recent Labs   Lab 01/25/24  0141 01/25/24  0403 01/25/24  0415   WBC  --  11.46  --    HGB  --  13.6*  --    HCT 47 42.0 41   PLT  --  265  --    MCV  --  94  --    RDW  --  12.6  --         Chemistries:  Recent Labs   Lab 01/25/24  0403   *   K 4.9   CL 99   CO2 27   BUN 16   CREATININE 0.8  0.8   CALCIUM 9.5   ALBUMIN 3.6   PROT 7.4   BILITOT 0.4   ALKPHOS 68   ALT 23   AST 27   MG 2.2       BMP:   Recent Labs   Lab 01/25/24  0403   *   *   K 4.9   CL 99   CO2 27   BUN 16   CREATININE 0.8  0.8   CALCIUM 9.5   MG 2.2     CMP:   Recent Labs   Lab 01/25/24  0403   *   K 4.9   CL 99   CO2 27   *   BUN 16   CREATININE 0.8   0.8   CALCIUM 9.5   PROT 7.4   ALBUMIN 3.6   BILITOT 0.4   ALKPHOS 68   AST 27   ALT 23   ANIONGAP 7*       Significant Imaging: I have reviewed all pertinent imaging results/findings within the past 24 hours.  Assessment/Plan:     Pulmonary  ILD (interstitial lung disease)  Mk Gaines is a 53 y.o. male with PMH of obesity, htn, NESTOR, and a recent diagnosis of interstitial lung disease who is sp VATs with wedge biopsy for diagnostic purposes on 1/24/24. Stepped up to the ICU for hypercapnia and AMS overnight for further monitoring      Neuro/Psych:   -- Sedation: none  -- Pain: tylenol 1000 q8, gabapentin 300 TID, robaxin 500 QID, lidocaine, oxy 5/10 prn             Cards:   -- HDS  Hx of htn  -- Restart home amlodipine and home losartan when appropriate      Pulm:   Sp VATS/ Wedge Resection of lung- right upper lobe, right middle lobe, and right lower lobe on 1/25  Stepped up for hypercapnia and AMS 1/25 am  -- CXR this am with some evidence of edema, lasix 40  -- Aggressive incentive spirometry   -- 1x chest tube on R, to water seal this am  -- Goal O2 sat > 88%, on 5L, wean as able  -- Daily chest x ray while chest tube in place  -- CPAP during sleep      Renal:  -- BUN/Cr wnl  Recent Labs   Lab 01/25/24  0403   BUN 16   CREATININE 0.8  0.8     -- Urine output: not charted      FEN / GI:   -- Net positive 1L  -- Replace lytes as needed  -- Nutrition: reg diet  -- bowel reg on      ID:   -- Tm: afebrile; WBC wnl  -- fu intra op cultures sent 1/24  Recent Labs   Lab 01/25/24  0403   WBC 11.46     -- Abx none      Heme/Onc:  -- H/H stable   -- Daily CBC  Recent Labs   Lab 01/25/24  0403   HGB 13.6*            Endo:   -- Gluc goal 140-180  -- SSI      PPx:   Feeding: reg diet  Analgesia/Sedation: MMD/none  Thromboembolic prevention: lovenox  HOB >30: yes  Stress Ulcer ppx: n/a  Glucose control: Critical care goal 140-180 g/dl, SSI  Bowel reg: miralax, senna docusate  Invasive Lines/Drains/Airway: R chest  tube  Deescalation: likely step down this pm if continues to do well        Dispo/Code Status/Palliative:   -- likely step down this afternoon / Full Code   -- appreciate nursing assistance with aggressive walking/OOB to chair             Raquel Ann MD  Critical Care - Surgery  [facility]

## 2024-01-25 NOTE — HPI
53 y.o. male never smoker with HTN, IFG, obesity (BMI: 37.9) who is referred by Pulmonology, Dr. Pastor, evaluation of interstitial lung disease sp VATS wedge biopsy 1/24. Chest CT 12/27/23 shows reactive airspace disease (air trapping) as well as possible early interstitial lung disease with regions of bronchiectasis, pleuroparenchymal scarring and interlobular septal thickening. PFTs show restriction; however, FEV1 70%. Rheumatologic work-up normal. 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 His home medications include amlodipine, losartan, metformin.    Around 0100 on 1/25/24 rapid response called for altered mentation and SpO2 Low 90s. ABG showing hypercapnic respiratory acidosis w/ pCO2 80. Patient placed on BiPAP 10/5 40% FiO2 and stepped up to SICU for cont BiPAP. On arrival to SICU patient is on BiPAP satting 100%. BiPAP transitioned to 5L NC and patient observed for several minutes during interview. O2 stable throughout conversation on 5L NC. Son at bedside assisting with translation. Patient denies pain and states that he is feeling like normal again other than being tired from being awake all night. Surgical incision is c/d/I. No other wounds skin breakdown on exposure. Lung still appear under expanded on CXR, but CXR at 0500 is improved when compared to 0100 CXR.

## 2024-01-25 NOTE — SUBJECTIVE & OBJECTIVE
Follow-up For: Procedure(s) (LRB):  RIGHT VATS WEDGE BIOPSY (Right)    Post-Operative Day: 1 Day Post-Op     Past Medical History:   Diagnosis Date    Diabetes     Hypertension        Past Surgical History:   Procedure Laterality Date    VIDEO-ASSISTED THORACOSCOPIC SURGERY (VATS) Right 1/24/2024    Procedure: RIGHT VATS WEDGE BIOPSY;  Surgeon: Boogie Alva MD;  Location: [facility] OR [address] [place] [address];  Service: Cardiothoracic;  Laterality: Right;       Review of patient's allergies indicates:   Allergen Reactions    Thiazides Other (See Comments)     Hyponatremia        Family History       Problem Relation (Age of Onset)    Diabetes Mother    Hypertension Mother    No Known Problems Father          Tobacco Use    Smoking status: Never    Smokeless tobacco: Never   Substance and Sexual Activity    Alcohol use: Yes    Drug use: Not on file    Sexual activity: Not on file      Review of Systems   Constitutional:  Negative for fever.   HENT:  Negative for congestion.    Respiratory:  Negative for shortness of breath.    Cardiovascular:  Negative for chest pain.   Gastrointestinal:  Negative for abdominal pain and nausea.     Objective:     Vital Signs (Most Recent):  Temp: 98.4 °F (36.9 °C) (01/25/24 0700)  Pulse: 89 (01/25/24 0743)  Resp: 19 (01/25/24 0743)  BP: (!) 143/94 (01/25/24 0700)  SpO2: (!) 94 % (01/25/24 0743) Vital Signs (24h Range):  Temp:  [97.4 °F (36.3 °C)-99.2 °F (37.3 °C)] 98.4 °F (36.9 °C)  Pulse:  [] 89  Resp:  [16-45] 19  SpO2:  [90 %-100 %] 94 %  BP: (106-175)/(66-94) 143/94     Weight: 102.1 kg (225 lb 1.4 oz)  Body mass index is 39.87 kg/m².      Intake/Output Summary (Last 24 hours) at 1/25/2024 0751  Last data filed at 1/25/2024 0733  Gross per 24 hour   Intake 1094.64 ml   Output 139 ml   Net 955.64 ml          Physical Exam  Vitals and nursing note reviewed.   Constitutional:       General: He is not in acute distress.     Appearance: He is obese. He is not ill-appearing.   HENT:      Head:  Normocephalic.      Mouth/Throat:      Mouth: Mucous membranes are moist.      Pharynx: Oropharynx is clear.   Eyes:      General: No scleral icterus.     Extraocular Movements: Extraocular movements intact.      Conjunctiva/sclera: Conjunctivae normal.   Cardiovascular:      Rate and Rhythm: Normal rate.      Pulses: Normal pulses.   Pulmonary:      Effort: Pulmonary effort is normal. No respiratory distress.      Breath sounds: No wheezing.      Comments: R chest tube in place, to water seal, no leak    5L nasal cannula  Abdominal:      General: There is no distension.      Palpations: Abdomen is soft.      Comments: Round abdomen   Musculoskeletal:         General: No swelling or tenderness. Normal range of motion.      Cervical back: Normal range of motion.   Skin:     General: Skin is warm and dry.      Coloration: Skin is not jaundiced or pale.   Neurological:      General: No focal deficit present.      Mental Status: He is alert and oriented to person, place, and time.   Psychiatric:         Mood and Affect: Mood normal.            Vents:  Oxygen Concentration (%): 50 (01/25/24 0608)    Lines/Drains/Airways       Drain  Duration                  Chest Tube 01/24/24 1031 Tube - 1 Right 24 Fr. <1 day              Peripheral Intravenous Line  Duration                  Peripheral IV - Single Lumen 01/24/24 0635 18 G Left;Posterior Forearm 1 day         Peripheral IV - Single Lumen 01/24/24 0844 18 G Right;Anterior Forearm <1 day                    Significant Labs:    CBC/Anemia Profile:  Recent Labs   Lab 01/25/24  0141 01/25/24  0403 01/25/24  0415   WBC  --  11.46  --    HGB  --  13.6*  --    HCT 47 42.0 41   PLT  --  265  --    MCV  --  94  --    RDW  --  12.6  --         Chemistries:  Recent Labs   Lab 01/25/24  0403   *   K 4.9   CL 99   CO2 27   BUN 16   CREATININE 0.8  0.8   CALCIUM 9.5   ALBUMIN 3.6   PROT 7.4   BILITOT 0.4   ALKPHOS 68   ALT 23   AST 27   MG 2.2       BMP:   Recent Labs   Lab  01/25/24  0403   *   *   K 4.9   CL 99   CO2 27   BUN 16   CREATININE 0.8  0.8   CALCIUM 9.5   MG 2.2     CMP:   Recent Labs   Lab 01/25/24  0403   *   K 4.9   CL 99   CO2 27   *   BUN 16   CREATININE 0.8  0.8   CALCIUM 9.5   PROT 7.4   ALBUMIN 3.6   BILITOT 0.4   ALKPHOS 68   AST 27   ALT 23   ANIONGAP 7*       Significant Imaging: I have reviewed all pertinent imaging results/findings within the past 24 hours.

## 2024-01-25 NOTE — PLAN OF CARE
[facility] - [facility]  Initial Discharge Assessment       Primary Care Provider: Ambrocio Olvera MD    Admission Diagnosis: ILD (interstitial lung disease) [J84.9]    Admission Date: 1/24/2024  Expected Discharge Date:          Payor: MEDICAID / Plan: [facility] CONNECT / Product Type: Managed Medicaid /     Extended Emergency Contact Information  Primary Emergency Contact: Mk Varma Jr.  Mobile Phone: [phone]  Relation: Son  Secondary Emergency Contact: JESSE VARMA  Mobile Phone: [phone]  Relation: Son    Discharge Plan A: Home  Discharge Plan B: Home with family      [id number] - [place], [place] - [address] [facility] [address] [facility]  [address]  [facility] [address]  Phone: [phone] Fax: [phone]    [id number] - [place], [place] - [address] [facility] [address]  [address]  [facility] [address]  Phone: [phone] Fax: [phone]    [facility]  [address]  [facility] [address]  Phone: [phone] Fax: [phone]    [facility] - [place], [place] - [address] [place] [address]  [address]  [facility] [address]  Phone: [phone] Fax: [phone]      Initial Assessment (most recent)       Adult Discharge Assessment - 01/25/24 1122          Discharge Assessment    Confirmed/corrected address, phone number and insurance Yes     Confirmed Demographics Correct on Facesheet     Source of Information patient     Communicated BRITTNI with patient/caregiver Date not available/Unable to determine     Reason For Admission ILD (interstitial lung disease)     People in Home spouse;child(jorge), adult     Do you expect to return to your current living situation? Yes     Do you have help at home or someone to help you manage your care at home? Yes     Who are your caregiver(s) and their phone number(s)? Mk Varma Jr     Prior to hospitilization  cognitive status: Alert/Oriented     Current cognitive status: Alert/Oriented     Walking or Climbing Stairs Difficulty no     Dressing/Bathing Difficulty no     Equipment Currently Used at Home none     Patient currently being followed by outpatient case management? No     Do you currently have service(s) that help you manage your care at home? No     Do you have prescription coverage? Yes     Coverage MEDICAID - [facility] CONNECT -     Who is going to help you get home at discharge? Mk Gaines     Are you on dialysis? No     Do you take coumadin? No     Discharge Plan A Home     Discharge Plan B Home with family     DME Needed Upon Discharge  none     Discharge Plan discussed with: Patient        Physical Activity    On average, how many days per week do you engage in moderate to strenuous exercise (like a brisk walk)? Patient declined     On average, how many minutes do you engage in exercise at this level? Patient declined        Financial Resource Strain    How hard is it for you to pay for the very basics like food, housing, medical care, and heating? Patient declined        Housing Stability    In the last 12 months, was there a time when you were not able to pay the mortgage or rent on time? Patient declined     In the last 12 months, was there a time when you did not have a steady place to sleep or slept in a shelter (including now)? Patient declined        Transportation Needs    In the past 12 months, has lack of transportation kept you from medical appointments or from getting medications? Patient declined     In the past 12 months, has lack of transportation kept you from meetings, work, or from getting things needed for daily living? Patient declined        Food Insecurity    Within the past 12 months, you worried that your food would run out before you got the money to buy more. Patient declined     Within the past 12 months, the food you bought just didn't last and you didn't have money to get  more. Patient declined        Stress    Do you feel stress - tense, restless, nervous, or anxious, or unable to sleep at night because your mind is troubled all the time - these days? Patient declined        Social Connections    In a typical week, how many times do you talk on the phone with family, friends, or neighbors? Patient declined     How often do you attend Sabianism or Evangelical services? Patient declined     Do you belong to any clubs or organizations such as Sabianism groups, unions, fraternal or athletic groups, or school groups? Patient declined     How often do you attend meetings of the clubs or organizations you belong to? Patient declined     Are you , , , , never , or living with a partner? Patient declined        Alcohol Use    Q1: How often do you have a drink containing alcohol? Patient declined     Q2: How many drinks containing alcohol do you have on a typical day when you are drinking? Patient declined     Q3: How often do you have six or more drinks on one occasion? Patient declined                   This SW met with patient and family at bedside to complete DPA. The SW used the  service Lionel [id number]. Questions answered / contact numbers provided.  Use PREFERRED PHARMACY / BEDSIDE DELIVERY for any necessary medications at time of discharge. The patient is independent with all ADLs - does not use DME, In-home equipment, is not on HD, Coumarin or home oxygen. The patient's family will be assisting with help upon discharge. The patient's son  will be providing transportation home.     Will continue to follow for course of hospitalization.       Discharge Plan A and Plan B have been determined by review of patient's clinical status, future medical and therapeutic needs, and coverage/benefits for post-acute care in coordination with multidisciplinary team members.      David Richey LMSW  Case Management [facility]

## 2024-01-25 NOTE — CARE UPDATE
Admitted to SICU for higher level of care/possible continuous BiPAP  Arrived to [address] on BiPAP 10/5 40%, sats 95%  Patient awake and alert, oriented x4  Weaned to 5L NC, SpO2 93-96%. Pt denies shortness of breath at rest, mild dyspnea with exertion. Lung sounds coarse  Chest tube site clean/dry/intact, no bruising, bleeding, or crepitus noted. Sanguineous drainage noted in collection chamber. To -20 continuous suction

## 2024-01-25 NOTE — PROGRESS NOTES
[facility]  Cardiothoracic Surgery  Progress Note    Patient Name: Mk Gaines  MRN: [id number]  Admission Date: 1/24/2024  Hospital Length of Stay: 1 days  Code Status: Full Code   Attending Physician: Boogie Alva MD   Referring Provider: Boogie Alva MD  Principal Problem:ILD (interstitial lung disease)      Subjective:     Post-Op Info:  Procedure(s) (LRB):  RIGHT VATS WEDGE BIOPSY (Right)   1 Day Post-Op     Interval History: Transferred to SICU overnight due to altered mentation and hypercapnic respiratory acidosis. BiPAP overnight and back to nasal cannula this morning. Pain is adequately controlled. Denies SOB.      Medications:  Continuous Infusions:  Scheduled Meds:   acetaminophen  1,000 mg Oral Q8H    albuterol-ipratropium  3 mL Nebulization Q6H    enoxparin  40 mg Subcutaneous Q24H (prophylaxis, 1700)    gabapentin  300 mg Oral TID    LIDOcaine  1 patch Transdermal Q24H    methocarbamoL  500 mg Oral QID    polyethylene glycol  17 g Oral Daily    senna-docusate 8.6-50 mg  1 tablet Oral BID     PRN Meds:bisacodyL, dextrose 10%, dextrose 10%, glucagon (human recombinant), glucose, glucose, insulin aspart U-100, ondansetron, oxyCODONE, oxyCODONE     Objective:     Vital Signs (Most Recent):  Temp: 98.4 °F (36.9 °C) (01/25/24 0700)  Pulse: 93 (01/25/24 0800)  Resp: (!) 30 (01/25/24 0800)  BP: (!) 146/94 (01/25/24 0800)  SpO2: (!) 94 % (01/25/24 0800) Vital Signs (24h Range):  Temp:  [97.4 °F (36.3 °C)-99.2 °F (37.3 °C)] 98.4 °F (36.9 °C)  Pulse:  [] 93  Resp:  [16-45] 30  SpO2:  [90 %-100 %] 94 %  BP: (106-175)/(66-94) 146/94     Weight: 102.1 kg (225 lb 1.4 oz)  Body mass index is 39.87 kg/m².    SpO2: (!) 94 %       Intake/Output - Last 3 Shifts         01/23 0700  01/24 0659 01/24 0700 01/25 0659 01/25 0700 01/26 0659    P.O.   240    IV Piggyback  1094.6     Total Intake(mL/kg)  1094.6 (10.7) 240 (2.4)    Urine (mL/kg/hr)   600 (3.1)    Stool  0     Chest Tube   135 4    Total Output  135 604    Net  +959.6 -364           Stool Occurrence  0 x             Lines/Drains/Airways       Drain  Duration                  Chest Tube 01/24/24 1031 Tube - 1 Right 24 Fr. <1 day              Peripheral Intravenous Line  Duration                  Peripheral IV - Single Lumen 01/24/24 0635 18 G Left;Posterior Forearm 1 day         Peripheral IV - Single Lumen 01/24/24 0844 18 G Right;Anterior Forearm 1 day                     Physical Exam  Constitutional:       General: He is not in acute distress.  Cardiovascular:      Rate and Rhythm: Normal rate and regular rhythm.   Pulmonary:      Effort: Pulmonary effort is normal. No respiratory distress.      Comments: Chest tube in place to suction, serosanguinous output, no air leak  Abdominal:      General: There is no distension.   Skin:     General: Skin is warm and dry.   Neurological:      General: No focal deficit present.      Mental Status: He is alert and oriented to person, place, and time.            Significant Labs:  All pertinent labs from the last 24 hours have been reviewed.    Significant Diagnostics:  I have reviewed all pertinent imaging results/findings within the past 24 hours.  Assessment/Plan:     * ILD (interstitial lung disease)  53M s/p diagnostic VATS wedge resection x3 for presumed interstitial lung disease. Transferred to ICU overnight for AMS and hypercapnic respiratory acidosis.     Placed chest tube to water seal  Start diuresis  Multimodal analgesia  Encouraged IS, OOB, ambulation  Possible step down this afternoon          Barrie Marquez DO  Cardiothoracic Surgery  [facility]

## 2024-01-25 NOTE — CARE UPDATE
"Progress Note    Paged by nurse due to patient found somnolent and minimally responsive, with oxygen saturations around 80%. Finally able to be aroused.    Patient seen and examined at bedside. Patient oxygen saturation improved with arousal, sating in the low 90%'s with stable vital signs on 3LNC. Patient with wheezing and decreased breath sounds on physical exam currently receiving breathing treatment. ABG at the time acidotic and hypercapnic, suggestive of respiratory acidosis. No evidence of chest tube leak. And CXR suggestive of ILD, with no evidence of pneumo.     Reached out to discuss with SICU team Dr. Tameka Moran, Alonso Andrade and Rapid Response nurse Elana Edmondson. Determined to start BIPAP at 10/5 40% for 4 hours with repeat ABG.     Patient reexamined 2 hours later resting comfortably, VSS, with oxygenation saturation of 95% and no evidence of chest tube leak. Please continue to monitor chest tube and vital signs.     Addendum: Repeat ABG showed minimal improvement, the decision was made to step the patient up to SICU for increased monitoring     Reza Hodgson ([place]),   PGY-1, [facility]      "

## 2024-01-25 NOTE — ASSESSMENT & PLAN NOTE
Mk Gaines is a 53 y.o. male with PMH of obesity, htn, NESTOR, and a recent diagnosis of interstitial lung disease who is sp VATs with wedge biopsy for diagnostic purposes on 1/24/24. Stepped up to the ICU for hypercapnia and AMS overnight for further monitoring      Neuro/Psych:   -- Sedation: none  -- Pain: tylenol 1000 q8, gabapentin 300 TID, robaxin 500 QID, lidocaine, oxy 5/10 prn             Cards:   -- HDS  Hx of htn  -- Restart home amlodipine and home losartan when appropriate      Pulm:   Sp VATS/ Wedge Resection of lung- right upper lobe, right middle lobe, and right lower lobe on 1/25  Stepped up for hypercapnia and AMS 1/25 am  -- CXR this am with some evidence of edema, lasix 40  -- Aggressive incentive spirometry   -- 1x chest tube on R, to water seal this am  -- Goal O2 sat > 88%, on 5L, wean as able  -- Daily chest x ray while chest tube in place  -- CPAP during sleep      Renal:  -- BUN/Cr wnl  Recent Labs   Lab 01/25/24  0403   BUN 16   CREATININE 0.8  0.8     -- Urine output: not charted      FEN / GI:   -- Net positive 1L  -- Replace lytes as needed  -- Nutrition: reg diet  -- bowel reg on      ID:   -- Tm: afebrile; WBC wnl  -- fu intra op cultures sent 1/24  Recent Labs   Lab 01/25/24  0403   WBC 11.46     -- Abx none      Heme/Onc:  -- H/H stable   -- Daily CBC  Recent Labs   Lab 01/25/24  0403   HGB 13.6*            Endo:   -- Gluc goal 140-180  -- SSI      PPx:   Feeding: reg diet  Analgesia/Sedation: MMD/none  Thromboembolic prevention: lovenox  HOB >30: yes  Stress Ulcer ppx: n/a  Glucose control: Critical care goal 140-180 g/dl, SSI  Bowel reg: miralax, senna docusate  Invasive Lines/Drains/Airway: R chest tube  Deescalation: likely step down this pm if continues to do well        Dispo/Code Status/Palliative:   -- likely step down this afternoon / Full Code   -- appreciate nursing assistance with aggressive walking/OOB to chair

## 2024-01-25 NOTE — NURSING TRANSFER
Nursing Transfer Note      1/25/2024   6:35 AM    Nurse giving handoff: GENESIS Zhao  Nurse receiving handoff:GENESIS Jacobsen    Reason patient is being transferred: increased monitoring    Transfer to SICU    Transfer via bed    Transfer with  BIPAP and cardiac monitoring    Transported by Charge nurse GENESIS Villegas    Order for Tele Monitor? Yes    Additional Lines: Chest Tube    Patient belongings transferred with patient: Yes    Chart send with patient: Yes    Notified: spouse, son    Upon arrival to floor: cardiac monitor applied, patient oriented to room, call bell in reach, and bed in lowest position

## 2024-01-25 NOTE — ASSESSMENT & PLAN NOTE
53M s/p diagnostic VATS wedge resection x3 for presumed interstitial lung disease. Transferred to ICU overnight for AMS and hypercapnic respiratory acidosis.     Placed chest tube to water seal  Start diuresis  Multimodal analgesia  Encouraged IS, OOB, ambulation  Possible step down this afternoon

## 2024-01-25 NOTE — NURSING
Nurses Note -- 4 Eyes      1/24/2024   7:46 PM      Skin assessed during: Admit      [x] No Altered Skin Integrity Present    []Prevention Measures Documented      [] Yes- Altered Skin Integrity Present or Discovered   [] LDA Added if Not in Epic (Describe Wound)   [] New Altered Skin Integrity was Present on Admit and Documented in LDA   [] Wound Image Taken    Wound Care Consulted? No    Attending Nurse:  Cherelle Alberts RN/Staff Member: Kelly

## 2024-01-25 NOTE — PLAN OF CARE
"[facility] Plan of Care Note    Dx:   ILD (interstitial lung disease) [J84.9]    Shift Events: At approximately 0100 patient was hard to arouse. Patient oxygen sat was in the low 80s on 3L NC. Patient was pulled up in bed and increased oxygen flow. Patient was snoring and was not responding to verbal call out from nurse, respiratory therapist, or family. Breathing trx was given by respiratory. Chest tube was assessed for air leaks-no leaks present. Chest tube continued to be connected to suction. Respiratory therapist called rapid response. Dr. Hodgson was paged. Order for CXR STAT was given and ABGs were obtained. Patient reported he was having a hard time breathing. His pain level was at 4/10 when resting and 8/10 when coughing. Patient was taking shallow breaths. Difficult to confirm if patient was not taking deep breaths due to pain or due to being lethargic. Patient ABGs results displayed that patient was hypercapnia-81 and acidotic.   O2 sats increased to 95% with breathing trx. BP wnl; HR tachy in the low 100s; Continuous telemetry and oxygen were ordered. Blood glucose level 201. Patient became more alert but was hard to stay awoke. Dr. Brush ordered for patient to be on BIPAP for 4 hours and reassess ABGs. Patient desaturated with BIPAP on 30%. Increased BIPAP to 40% sats increased to the low 90s. Heart rate wnl. Updated spouse and son of POC.     After a few hours of being on BIPAP another set ABGs were obtained with minimal improvement. Dr. Hodgson notified. CXR ordered. Order to transfer patient to SICU was given. Family notified and updated on POC.     Goals of Care: pain management, chest tube care,     Neuro: Aox4 at beginning of shift; hard to arouse around 0100.    Vital Signs: BP (!) 128/94   Pulse 95   Temp 99.2 °F (37.3 °C) (Axillary)   Resp (!) 23   Ht 5' 3" (1.6 m)   Wt 102.1 kg (225 lb 1.4 oz)   SpO2 95%   BMI 39.87 kg/m²      Respiratory: 3L NC    Diet: Diet clear liquid,Diet Adult Regular " (IDDSI Level 7)      Is patient tolerating current diet? Yes    GTTS: ABX    Urine Output/Bowel Movement:   No intake/output data recorded.  Last Bowel Movement: 01/24/24      Drains/Tubes/Tube Feeds (include total output/shift):   No intake/output data recorded.  Chest tube -45cc    Lines: PIV X2 18g RAINER left and right forearm       Accuchecks: ACHS    Skin: INCISIONS     Fall Risk Score: 7      Problem: Adult Inpatient Plan of Care  Goal: Plan of Care Review  Outcome: Ongoing, Progressing  Goal: Patient-Specific Goal (Individualized)  Outcome: Ongoing, Progressing  Goal: Absence of Hospital-Acquired Illness or Injury  Outcome: Ongoing, Progressing  Goal: Optimal Comfort and Wellbeing  Outcome: Ongoing, Progressing  Goal: Readiness for Transition of Care  Outcome: Ongoing, Progressing     Problem: Fall Injury Risk  Goal: Absence of Fall and Fall-Related Injury  Outcome: Ongoing, Progressing     Problem: Pain Acute  Goal: Acceptable Pain Control and Functional Ability  Outcome: Ongoing, Progressing

## 2024-01-25 NOTE — SUBJECTIVE & OBJECTIVE
Interval History: Transferred to SICU overnight due to altered mentation and hypercapnic respiratory acidosis. BiPAP overnight and back to nasal cannula this morning. Pain is adequately controlled. Denies SOB.      Medications:  Continuous Infusions:  Scheduled Meds:   acetaminophen  1,000 mg Oral Q8H    albuterol-ipratropium  3 mL Nebulization Q6H    enoxparin  40 mg Subcutaneous Q24H (prophylaxis, 1700)    gabapentin  300 mg Oral TID    LIDOcaine  1 patch Transdermal Q24H    methocarbamoL  500 mg Oral QID    polyethylene glycol  17 g Oral Daily    senna-docusate 8.6-50 mg  1 tablet Oral BID     PRN Meds:bisacodyL, dextrose 10%, dextrose 10%, glucagon (human recombinant), glucose, glucose, insulin aspart U-100, ondansetron, oxyCODONE, oxyCODONE     Objective:     Vital Signs (Most Recent):  Temp: 98.4 °F (36.9 °C) (01/25/24 0700)  Pulse: 93 (01/25/24 0800)  Resp: (!) 30 (01/25/24 0800)  BP: (!) 146/94 (01/25/24 0800)  SpO2: (!) 94 % (01/25/24 0800) Vital Signs (24h Range):  Temp:  [97.4 °F (36.3 °C)-99.2 °F (37.3 °C)] 98.4 °F (36.9 °C)  Pulse:  [] 93  Resp:  [16-45] 30  SpO2:  [90 %-100 %] 94 %  BP: (106-175)/(66-94) 146/94     Weight: 102.1 kg (225 lb 1.4 oz)  Body mass index is 39.87 kg/m².    SpO2: (!) 94 %       Intake/Output - Last 3 Shifts         01/23 0700 01/24 0659 01/24 0700 01/25 0659 01/25 0700 01/26 0659    P.O.   240    IV Piggyback  1094.6     Total Intake(mL/kg)  1094.6 (10.7) 240 (2.4)    Urine (mL/kg/hr)   600 (3.1)    Stool  0     Chest Tube  135 4    Total Output  135 604    Net  +959.6 -364           Stool Occurrence  0 x             Lines/Drains/Airways       Drain  Duration                  Chest Tube 01/24/24 1031 Tube - 1 Right 24 Fr. <1 day              Peripheral Intravenous Line  Duration                  Peripheral IV - Single Lumen 01/24/24 0635 18 G Left;Posterior Forearm 1 day         Peripheral IV - Single Lumen 01/24/24 0844 18 G Right;Anterior Forearm 1 day                      Physical Exam  Constitutional:       General: He is not in acute distress.  Cardiovascular:      Rate and Rhythm: Normal rate and regular rhythm.   Pulmonary:      Effort: Pulmonary effort is normal. No respiratory distress.      Comments: Chest tube in place to suction, serosanguinous output, no air leak  Abdominal:      General: There is no distension.   Skin:     General: Skin is warm and dry.   Neurological:      General: No focal deficit present.      Mental Status: He is alert and oriented to person, place, and time.            Significant Labs:  All pertinent labs from the last 24 hours have been reviewed.    Significant Diagnostics:  I have reviewed all pertinent imaging results/findings within the past 24 hours.

## 2024-01-26 VITALS
DIASTOLIC BLOOD PRESSURE: 80 MMHG | HEART RATE: 77 BPM | TEMPERATURE: 99 F | OXYGEN SATURATION: 99 % | RESPIRATION RATE: 18 BRPM | SYSTOLIC BLOOD PRESSURE: 118 MMHG | BODY MASS INDEX: 39.88 KG/M2 | HEIGHT: 63 IN | WEIGHT: 225.06 LBS

## 2024-01-26 LAB
ALBUMIN SERPL BCP-MCNC: 3.6 G/DL (ref 3.5–5.2)
ALP SERPL-CCNC: 66 U/L (ref 55–135)
ALT SERPL W/O P-5'-P-CCNC: 17 U/L (ref 10–44)
ANION GAP SERPL CALC-SCNC: 3 MMOL/L (ref 8–16)
AST SERPL-CCNC: 23 U/L (ref 10–40)
BACTERIA SPEC AEROBE CULT: NORMAL
BASOPHILS # BLD AUTO: 0.02 K/UL (ref 0–0.2)
BASOPHILS NFR BLD: 0.2 % (ref 0–1.9)
BILIRUB SERPL-MCNC: 0.4 MG/DL (ref 0.1–1)
BUN SERPL-MCNC: 20 MG/DL (ref 6–20)
CALCIUM SERPL-MCNC: 9.4 MG/DL (ref 8.7–10.5)
CHLORIDE SERPL-SCNC: 98 MMOL/L (ref 95–110)
CO2 SERPL-SCNC: 34 MMOL/L (ref 23–29)
CREAT SERPL-MCNC: 0.8 MG/DL (ref 0.5–1.4)
DIFFERENTIAL METHOD BLD: ABNORMAL
EOSINOPHIL # BLD AUTO: 0 K/UL (ref 0–0.5)
EOSINOPHIL NFR BLD: 0.1 % (ref 0–8)
ERYTHROCYTE [DISTWIDTH] IN BLOOD BY AUTOMATED COUNT: 12.9 % (ref 11.5–14.5)
EST. GFR  (NO RACE VARIABLE): >60 ML/MIN/1.73 M^2
FINAL PATHOLOGIC DIAGNOSIS: NORMAL
GLUCOSE SERPL-MCNC: 126 MG/DL (ref 70–110)
HCT VFR BLD AUTO: 40.7 % (ref 40–54)
HGB BLD-MCNC: 13 G/DL (ref 14–18)
IMM GRANULOCYTES # BLD AUTO: 0.04 K/UL (ref 0–0.04)
IMM GRANULOCYTES NFR BLD AUTO: 0.3 % (ref 0–0.5)
LYMPHOCYTES # BLD AUTO: 0.9 K/UL (ref 1–4.8)
LYMPHOCYTES NFR BLD: 7 % (ref 18–48)
Lab: NORMAL
MAGNESIUM SERPL-MCNC: 2.4 MG/DL (ref 1.6–2.6)
MCH RBC QN AUTO: 30 PG (ref 27–31)
MCHC RBC AUTO-ENTMCNC: 31.9 G/DL (ref 32–36)
MCV RBC AUTO: 94 FL (ref 82–98)
MONOCYTES # BLD AUTO: 0.7 K/UL (ref 0.3–1)
MONOCYTES NFR BLD: 5.4 % (ref 4–15)
NEUTROPHILS # BLD AUTO: 11.2 K/UL (ref 1.8–7.7)
NEUTROPHILS NFR BLD: 87 % (ref 38–73)
NRBC BLD-RTO: 0 /100 WBC
PHOSPHATE SERPL-MCNC: 4.8 MG/DL (ref 2.7–4.5)
PLATELET # BLD AUTO: 235 K/UL (ref 150–450)
PMV BLD AUTO: 10.9 FL (ref 9.2–12.9)
POCT GLUCOSE: 108 MG/DL (ref 70–110)
POCT GLUCOSE: 124 MG/DL (ref 70–110)
POTASSIUM SERPL-SCNC: 4.6 MMOL/L (ref 3.5–5.1)
PROT SERPL-MCNC: 7.4 G/DL (ref 6–8.4)
RBC # BLD AUTO: 4.33 M/UL (ref 4.6–6.2)
SODIUM SERPL-SCNC: 135 MMOL/L (ref 136–145)
WBC # BLD AUTO: 12.85 K/UL (ref 3.9–12.7)

## 2024-01-26 PROCEDURE — 25000003 PHARM REV CODE 250: Performed by: STUDENT IN AN ORGANIZED HEALTH CARE EDUCATION/TRAINING PROGRAM

## 2024-01-26 PROCEDURE — 94799 UNLISTED PULMONARY SVC/PX: CPT | Mod: XB

## 2024-01-26 PROCEDURE — 25000242 PHARM REV CODE 250 ALT 637 W/ HCPCS

## 2024-01-26 PROCEDURE — 94761 N-INVAS EAR/PLS OXIMETRY MLT: CPT

## 2024-01-26 PROCEDURE — 25000003 PHARM REV CODE 250

## 2024-01-26 PROCEDURE — 84100 ASSAY OF PHOSPHORUS: CPT

## 2024-01-26 PROCEDURE — 27100171 HC OXYGEN HIGH FLOW UP TO 24 HOURS

## 2024-01-26 PROCEDURE — 25000003 PHARM REV CODE 250: Performed by: SURGERY

## 2024-01-26 PROCEDURE — 99291 CRITICAL CARE FIRST HOUR: CPT | Mod: ,,, | Performed by: ANESTHESIOLOGY

## 2024-01-26 PROCEDURE — 80053 COMPREHEN METABOLIC PANEL: CPT

## 2024-01-26 PROCEDURE — 99900035 HC TECH TIME PER 15 MIN (STAT)

## 2024-01-26 PROCEDURE — 63600175 PHARM REV CODE 636 W HCPCS: Performed by: SURGERY

## 2024-01-26 PROCEDURE — 83735 ASSAY OF MAGNESIUM: CPT

## 2024-01-26 PROCEDURE — 94640 AIRWAY INHALATION TREATMENT: CPT

## 2024-01-26 PROCEDURE — 94660 CPAP INITIATION&MGMT: CPT

## 2024-01-26 PROCEDURE — 25000242 PHARM REV CODE 250 ALT 637 W/ HCPCS: Performed by: STUDENT IN AN ORGANIZED HEALTH CARE EDUCATION/TRAINING PROGRAM

## 2024-01-26 PROCEDURE — 85025 COMPLETE CBC W/AUTO DIFF WBC: CPT

## 2024-01-26 RX ORDER — BISACODYL 10 MG/1
10 SUPPOSITORY RECTAL ONCE
Status: DISCONTINUED | OUTPATIENT
Start: 2024-01-26 | End: 2024-01-26 | Stop reason: HOSPADM

## 2024-01-26 RX ORDER — METHOCARBAMOL 500 MG/1
500 TABLET, FILM COATED ORAL 3 TIMES DAILY
Qty: 30 TABLET | Refills: 0 | Status: SHIPPED | OUTPATIENT
Start: 2024-01-26 | End: 2024-02-06

## 2024-01-26 RX ORDER — OXYCODONE HYDROCHLORIDE 5 MG/1
5 TABLET ORAL EVERY 4 HOURS PRN
Qty: 30 TABLET | Refills: 0 | Status: SHIPPED | OUTPATIENT
Start: 2024-01-26

## 2024-01-26 RX ORDER — POLYETHYLENE GLYCOL 3350 17 G/17G
17 POWDER, FOR SOLUTION ORAL 2 TIMES DAILY PRN
Refills: 0 | COMMUNITY
Start: 2024-01-26

## 2024-01-26 RX ORDER — POLYETHYLENE GLYCOL 3350 17 G/17G
17 POWDER, FOR SOLUTION ORAL 2 TIMES DAILY
Status: DISCONTINUED | OUTPATIENT
Start: 2024-01-26 | End: 2024-01-26 | Stop reason: HOSPADM

## 2024-01-26 RX ORDER — IBUPROFEN 600 MG/1
600 TABLET ORAL 3 TIMES DAILY
Status: DISCONTINUED | OUTPATIENT
Start: 2024-01-26 | End: 2024-01-26 | Stop reason: HOSPADM

## 2024-01-26 RX ORDER — AMOXICILLIN 250 MG
1 CAPSULE ORAL 2 TIMES DAILY
Qty: 14 TABLET | Refills: 0 | Status: SHIPPED | OUTPATIENT
Start: 2024-01-26 | End: 2024-02-15 | Stop reason: SDUPTHER

## 2024-01-26 RX ORDER — ACETAMINOPHEN 500 MG
1000 TABLET ORAL EVERY 8 HOURS
Refills: 0 | COMMUNITY
Start: 2024-01-26 | End: 2024-02-02

## 2024-01-26 RX ORDER — IBUPROFEN 600 MG/1
600 TABLET ORAL 3 TIMES DAILY
Refills: 0 | COMMUNITY
Start: 2024-01-26 | End: 2024-01-31

## 2024-01-26 RX ORDER — MUPIROCIN 20 MG/G
OINTMENT TOPICAL 2 TIMES DAILY
Status: DISCONTINUED | OUTPATIENT
Start: 2024-01-26 | End: 2024-01-26 | Stop reason: HOSPADM

## 2024-01-26 RX ORDER — IPRATROPIUM BROMIDE AND ALBUTEROL SULFATE 2.5; .5 MG/3ML; MG/3ML
3 SOLUTION RESPIRATORY (INHALATION)
Status: DISCONTINUED | OUTPATIENT
Start: 2024-01-26 | End: 2024-01-26 | Stop reason: HOSPADM

## 2024-01-26 RX ADMIN — METHOCARBAMOL 500 MG: 500 TABLET ORAL at 08:01

## 2024-01-26 RX ADMIN — MUPIROCIN: 20 OINTMENT TOPICAL at 09:01

## 2024-01-26 RX ADMIN — KETOROLAC TROMETHAMINE 15 MG: 15 INJECTION, SOLUTION INTRAMUSCULAR; INTRAVENOUS at 05:01

## 2024-01-26 RX ADMIN — IPRATROPIUM BROMIDE AND ALBUTEROL SULFATE 3 ML: .5; 3 SOLUTION RESPIRATORY (INHALATION) at 07:01

## 2024-01-26 RX ADMIN — IPRATROPIUM BROMIDE AND ALBUTEROL SULFATE 3 ML: .5; 3 SOLUTION RESPIRATORY (INHALATION) at 01:01

## 2024-01-26 RX ADMIN — IPRATROPIUM BROMIDE AND ALBUTEROL SULFATE 3 ML: .5; 3 SOLUTION RESPIRATORY (INHALATION) at 12:01

## 2024-01-26 RX ADMIN — ACETAMINOPHEN 1000 MG: 500 TABLET ORAL at 01:01

## 2024-01-26 RX ADMIN — METHOCARBAMOL 500 MG: 500 TABLET ORAL at 01:01

## 2024-01-26 RX ADMIN — SENNOSIDES AND DOCUSATE SODIUM 1 TABLET: 8.6; 5 TABLET ORAL at 08:01

## 2024-01-26 RX ADMIN — POLYETHYLENE GLYCOL 3350 17 G: 17 POWDER, FOR SOLUTION ORAL at 08:01

## 2024-01-26 RX ADMIN — ACETAMINOPHEN 1000 MG: 500 TABLET ORAL at 05:01

## 2024-01-26 RX ADMIN — IBUPROFEN 600 MG: 600 TABLET, FILM COATED ORAL at 04:01

## 2024-01-26 RX ADMIN — LIDOCAINE 5% 1 PATCH: 700 PATCH TOPICAL at 11:01

## 2024-01-26 NOTE — HPI
53 y.o. male never smoker with HTN, IFG, obesity (BMI: 37.9) who is referred by Pulmonology, Dr. Pastor, evaluation of interstitial lung disease sp VATS wedge biopsy 1/24. Chest CT 12/27/23 shows reactive airspace disease (air trapping) as well as possible early interstitial lung disease with regions of bronchiectasis, pleuroparenchymal scarring and interlobular septal thickening. PFTs show restriction; however, FEV1 70%. Rheumatologic work-up normal. 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 His home medications include amlodipine, losartan, metformin.

## 2024-01-26 NOTE — SUBJECTIVE & OBJECTIVE
Interval History:   NAEON; CPAP overnight.   CT removed yesterday       Medications:  Continuous Infusions:  Scheduled Meds:   acetaminophen  1,000 mg Oral Q8H    albuterol-ipratropium  3 mL Nebulization Q6H WAKE    bisacodyL  10 mg Rectal Once    enoxparin  40 mg Subcutaneous Q24H (prophylaxis, 1700)    ketorolac  15 mg Intravenous Q8H    LIDOcaine  1 patch Transdermal Q24H    methocarbamoL  500 mg Oral TID    mupirocin   Nasal BID    polyethylene glycol  17 g Oral BID    senna-docusate 8.6-50 mg  1 tablet Oral BID     PRN Meds:bisacodyL, dextrose 10%, dextrose 10%, glucagon (human recombinant), glucose, glucose, insulin aspart U-100, ondansetron, oxyCODONE, oxyCODONE     Objective:     Vital Signs (Most Recent):  Temp: 98 °F (36.7 °C) (01/26/24 0725)  Pulse: 79 (01/26/24 0738)  Resp: 16 (01/26/24 0738)  BP: 122/87 (01/26/24 0725)  SpO2: 99 % (01/26/24 0738) Vital Signs (24h Range):  Temp:  [97.7 °F (36.5 °C)-99 °F (37.2 °C)] 98 °F (36.7 °C)  Pulse:  [] 79  Resp:  [16-37] 16  SpO2:  [92 %-100 %] 99 %  BP: (115-153)/(70-98) 122/87     Weight: 102.1 kg (225 lb 1.4 oz)  Body mass index is 39.87 kg/m².    SpO2: 99 %       Intake/Output - Last 3 Shifts         01/24 0700 01/25 0659 01/25 0700 01/26 0659 01/26 0700 01/27 0659    P.O.  1200     IV Piggyback 1094.6      Total Intake(mL/kg) 1094.6 (10.7) 1200 (11.8)     Urine (mL/kg/hr)  2400 (1)     Stool 0      Chest Tube 135 12     Total Output 135 2412     Net +959.6 -1212            Urine Occurrence   1 x    Stool Occurrence 0 x              Lines/Drains/Airways       Peripheral Intravenous Line  Duration                  Peripheral IV - Single Lumen 01/24/24 0635 18 G Left;Posterior Forearm 2 days         Peripheral IV - Single Lumen 01/24/24 0844 18 G Right;Anterior Forearm 2 days                     Physical Exam  Vitals and nursing note reviewed.   Constitutional:       General: He is not in acute distress.  HENT:      Head: Normocephalic and atraumatic.    Cardiovascular:      Rate and Rhythm: Normal rate and regular rhythm.   Pulmonary:      Effort: Pulmonary effort is normal. No respiratory distress.      Comments: Incisions c/d/I, dressings in place   Abdominal:      General: Abdomen is flat. There is no distension.      Palpations: Abdomen is soft.   Skin:     General: Skin is warm and dry.   Neurological:      General: No focal deficit present.      Mental Status: He is alert and oriented to person, place, and time.   Psychiatric:         Mood and Affect: Mood normal.         Behavior: Behavior normal.            Significant Labs:  All pertinent labs from the last 24 hours have been reviewed.    Significant Diagnostics:  I have reviewed all pertinent imaging results/findings within the past 24 hours.

## 2024-01-26 NOTE — NURSING
SICU PLAN OF CARE NOTE    Dx: ILD (interstitial lung disease)    Goals of Care: Monitor cognition & O2 sats    Vital Signs (last 12 hours):   Temp:  [97.7 °F (36.5 °C)-98.4 °F (36.9 °C)]   Pulse:  []   Resp:  [19-30]   BP: (115-153)/(81-94)   SpO2:  [92 %-97 %]      Neuro: AAO x4, Arouses to Voice, Follows Commands, and Moves All Extremities    Cardiac: NSR    Respiratory: Nasal Cannula 3 L    Urine Output: Voids Spontaneously 1100 cc/shift    Diet: Diabetic Diet     Labs/Accuchecks: daily labs/accu checks ACHS    Skin:  All skin remains free from injury.  Patient turned Q2, waffle mattress inflated, and bed working correctly.    Shift Events:  Patient O2 requirements decreased. OOBTC. Pulmonary hygiene promoted. Pain controlled. Chest tube d/c'd at end of shift.  See flowsheet for further assessment/details.  Family updated on current condition/plan of care, questions answered, and emotional support provided.  MD updated on current condition, vitals, labs, and gtts.  No new orders received, will continue to monitor.

## 2024-01-26 NOTE — PLAN OF CARE
SICU PLAN OF CARE NOTE    Dx: ILD (interstitial lung disease)    Goals of Care: MAP >65    Vital Signs (last 12 hours):   Temp:  [98.2 °F (36.8 °C)-99 °F (37.2 °C)]   Pulse:  [71-99]   Resp:  [16-37]   BP: (125-142)/(70-98)   SpO2:  [92 %-100 %]      Neuro: AAO x4, Follows Commands, and Moves All Extremities    Cardiac: NSR 70-90s    Respiratory: Nasal Cannula 3 L/min; slept with CPAP, FiO2 50%    Gtts: None    Urine Output: Voids Spontaneously 1300 cc/shift    Drains: None    Diet: Diabetic Diet    Labs/Accuchecks: Daily AM labs; AC/HS Accuchecks    Skin:  All skin remains free from injury.  Patient turned Q2, mattress inflated, and bed working correctly.    Shift Events:  No acute events noted overnight. Pain controlled with scheduled Tylenol and Ketorolac. See flowsheet for further assessment/details.  Family updated on current condition/plan of care, questions answered, and emotional support provided.  MD updated on current condition, vitals, labs, and gtts.  No new orders received, will continue to monitor.

## 2024-01-26 NOTE — DISCHARGE SUMMARY
[facility]  Cardiothoracic Surgery  Discharge Summary      Patient Name: Mk Gaines  MRN: [id number]  Admission Date: 1/24/2024  Hospital Length of Stay: 2 days  Discharge Date and Time:  01/26/2024 2:09 PM  Attending Physician: Boogie Alva MD   Discharging Provider: Barrie Marquez DO  Primary Care Provider: Ambrocio Olvera MD    HPI:   53 y.o. male never smoker with HTN, IFG, obesity (BMI: 37.9) who is referred by Pulmonology, Dr. Pastor, evaluation of interstitial lung disease sp VATS wedge biopsy 1/24. Chest CT 12/27/23 shows reactive airspace disease (air trapping) as well as possible early interstitial lung disease with regions of bronchiectasis, pleuroparenchymal scarring and interlobular septal thickening. PFTs show restriction; however, FEV1 70%. Rheumatologic work-up normal. 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 His home medications include amlodipine, losartan, metformin.     Procedure(s) (LRB):  RIGHT VATS WEDGE BIOPSY (Right)      Indwelling Lines/Drains at time of discharge:   Lines/Drains/Airways       None                 Hospital Course: On 1/24/24, Mk Gaines was taken to the operating room and uneventfully underwent the above listed procedure.  Patient was admitted post-operatively for chest tube management and pain control. In the early morning of POD 1, patient exhibited altered mental status with hypercapnic respiratory acidosis after he had been sleeping. He was transferred to the surgical ICU for non-invasive positive pressure ventilation and recovered well. Rested comfortably with good oxygen saturations overnight POD 1-2. Clinical picture suggestive of significant obstructive sleep apnea but no formal diagnosis. In the evening of POD 1, CXR and chest tube output were appropriate for tube removal.  At the time of discharge, patient demonstrated tolerance of a regular diet, ability to ambulate without assistance, appropriate use of an  incentive spirometer as instructed, and pain was adequately controlled with a multimodal analgesic regimen.  Patient was cleared for discharge home on POD 2 in good condition. In addition to post-op follow-up, we will also have him see his PCP in the near future to consider getting a sleep study arranged as he would likely benefit from a CPAP at home.       Goals of Care Treatment Preferences:  Code Status: Full Code          Significant Diagnostic Studies: N/A    Pending Diagnostic Studies:       Procedure Component Value Units Date/Time    Cytology, Fluid/Wash/Brush [[id number]] Collected: 01/24/24 1007    Order Status: Sent Lab Status: In process Updated: 01/25/24 0024    Specimen: Body Fluid     Specimen to Pathology, Surgery Pulmonary and Thoracic [[id number]] Collected: 01/24/24 1036    Order Status: Sent Lab Status: In process Updated: 01/24/24 1524    Specimen: Tissue             No new Assessment & Plan notes have been filed under this hospital service since the last note was generated.  Service: Cardiothoracic Surgery    Final Active Diagnoses:    Diagnosis Date Noted POA    PRINCIPAL PROBLEM:  ILD (interstitial lung disease) [J84.9] 10/25/2023 Yes    Class 2 severe obesity due to excess calories with serious comorbidity and body mass index (BMI) of 37.0 to 37.9 in adult [E66.01, Z68.37] 12/12/2023 Not Applicable    Prediabetes [R73.03] 06/11/2021 Yes    Essential hypertension [I10] 06/10/2021 Yes      Problems Resolved During this Admission:      Discharged Condition: good    Disposition: Home or Self Care    Follow Up:   Follow-up Information       Ambrocio Olvera MD. Schedule an appointment as soon as possible for a visit in 1 week(s).    Specialty: Internal Medicine  Why: Needs sleep study for obstructive sleep apnea  Contact information:  [address] [place] [address] [phone]                           Patient Instructions:      Diet diabetic     Notify your health care provider if  you experience any of the following:  temperature >100.4     Notify your health care provider if you experience any of the following:  persistent nausea and vomiting or diarrhea     Notify your health care provider if you experience any of the following:  severe uncontrolled pain     Notify your health care provider if you experience any of the following:  redness, tenderness, or signs of infection (pain, swelling, redness, odor or green/yellow discharge around incision site)     Notify your health care provider if you experience any of the following:  difficulty breathing or increased cough     Notify your health care provider if you experience any of the following:  worsening rash     Notify your health care provider if you experience any of the following:  increased confusion or weakness     Remove dressing in 24 hours     Change dressing (specify)   Order Comments: Dressing change as needed for expected drainage     Activity as tolerated     Shower on day dressing removed (No bath)     Medications:  Reconciled Home Medications:      Medication List        START taking these medications      acetaminophen 500 MG tablet  Commonly known as: TYLENOL  Take 2 tablets (1,000 mg total) by mouth every 8 (eight) hours. for 7 days     ibuprofen 600 MG tablet  Commonly known as: ADVIL,MOTRIN  Take 1 tablet (600 mg total) by mouth 3 (three) times daily. for 5 days     methocarbamoL 500 MG Tab  Commonly known as: ROBAXIN  Take 1 tablet (500 mg total) by mouth 3 (three) times daily. for 10 days     oxyCODONE 5 MG immediate release tablet  Commonly known as: ROXICODONE  [place] 1 tableta (5 mg en total) por vía oral cada 4 (cuatro) horas según sea necesario para el dolor.  (Take 1 tablet (5 mg total) by mouth every 4 (four) hours as needed for Pain.)     polyethylene glycol 17 gram Pwpk  Commonly known as: GLYCOLAX  Take 17 g by mouth 2 (two) times daily as needed for Constipation.     senna-docusate 8.6-50 mg 8.6-50 mg per  tablet  Commonly known as: PERICOLACE  [place] 1 tableta por vía oral 2 (dos) veces al día por 7 días  (Take 1 tablet by mouth 2 (two) times daily. for 7 days)            CHANGE how you take these medications      amLODIPine 10 MG tablet  Commonly known as: NORVASC  Take 1 tablet (10 mg total) by mouth once daily.  What changed: when to take this            CONTINUE taking these medications      losartan 50 MG tablet  Commonly known as: COZAAR  Take 1 tablet (50 mg total) by mouth once daily.     metFORMIN 500 MG ER 24hr tablet  Commonly known as: GLUCOPHAGE-XR  Take 2 tablets (1,000 mg total) by mouth daily with breakfast. Start with 1 tablet daily 1 week. Then go up to 2 tablets daily            Time spent on the discharge of patient: 35 minutes    Barrie Marquez DO  Cardiothoracic Surgery  [facility]

## 2024-01-26 NOTE — NURSING
01/26/2024      Mk Gaines  [address] [address]          [facility] Medicine Dept.  [facility] [address] [place] [address] [phone] after hours  [phone] fax Principal Diagnosis:  ILD (interstitial lung disease)                       Pulse Oximetry:    92% on Room Air at rest   91% on room air walking  96% on 2L nasal cannula at rest  93% on 2L nasal cannula walking     _______________  Sarah Barraza RN  01/26/2024

## 2024-01-26 NOTE — PROGRESS NOTES
[facility]  Critical Care - Surgery  Progress Note    Patient Name: Mk Gaines  MRN: [id number]  Admission Date: 1/24/2024  Hospital Length of Stay: 2 days  Code Status: Full Code  Attending Provider: Boogie Alva MD  Primary Care Provider: Ambrocio Olvera MD   Principal Problem: ILD (interstitial lung disease)    Subjective:     Hospital/ICU Course:  No notes on file    Interval History/Significant Events: NAEON. He is alert and oriented this morning. Patient used CPAP overnight and is now on RA satting 99%. He requests something to help have a bowel movement as he has not had one in 2 days. Abdomen non-tender. Patient continues to be stable for step down. [place] interpretor used during encounter.    Follow-up For: Procedure(s) (LRB):  RIGHT VATS WEDGE BIOPSY (Right)    Post-Operative Day: 2 Days Post-Op    Objective:     Vital Signs (Most Recent):  Temp: 98 °F (36.7 °C) (01/26/24 0725)  Pulse: 79 (01/26/24 0738)  Resp: 16 (01/26/24 0738)  BP: 122/87 (01/26/24 0725)  SpO2: 99 % (01/26/24 0738) Vital Signs (24h Range):  Temp:  [97.7 °F (36.5 °C)-99 °F (37.2 °C)] 98 °F (36.7 °C)  Pulse:  [] 79  Resp:  [16-37] 16  SpO2:  [92 %-100 %] 99 %  BP: (115-153)/(70-98) 122/87     Weight: 102.1 kg (225 lb 1.4 oz)  Body mass index is 39.87 kg/m².      Intake/Output Summary (Last 24 hours) at 1/26/2024 0847  Last data filed at 1/26/2024 0300  Gross per 24 hour   Intake 840 ml   Output 1808 ml   Net -968 ml          Physical Exam  Vitals and nursing note reviewed.   Constitutional:       General: He is not in acute distress.     Appearance: Normal appearance. He is obese. He is not ill-appearing, toxic-appearing or diaphoretic.   HENT:      Head: Normocephalic and atraumatic.      Right Ear: External ear normal.      Left Ear: External ear normal.      Nose: Nose normal.   Eyes:      General: No scleral icterus.        Right eye: No discharge.         Left eye: No discharge.      Extraocular  Movements: Extraocular movements intact.   Cardiovascular:      Rate and Rhythm: Normal rate and regular rhythm.   Pulmonary:      Effort: Pulmonary effort is normal. No respiratory distress.   Abdominal:      Palpations: There is no mass.      Tenderness: There is no abdominal tenderness. There is no guarding.      Comments: Protuberant abdomen   Musculoskeletal:         General: No swelling or deformity. Normal range of motion.      Cervical back: Normal range of motion and neck supple.      Right lower leg: No edema.      Left lower leg: No edema.   Skin:     General: Skin is warm.      Coloration: Skin is not jaundiced.      Findings: No bruising.   Neurological:      Mental Status: He is alert and oriented to person, place, and time.   Psychiatric:         Mood and Affect: Mood normal.         Behavior: Behavior normal.         Thought Content: Thought content normal.            Vents:  Oxygen Concentration (%): 50 (01/26/24 0600)    Lines/Drains/Airways       Peripheral Intravenous Line  Duration                  Peripheral IV - Single Lumen 01/24/24 0635 18 G Left;Posterior Forearm 2 days         Peripheral IV - Single Lumen 01/24/24 0844 18 G Right;Anterior Forearm 2 days                    Significant Labs:    CBC/Anemia Profile:  Recent Labs   Lab 01/25/24  0403 01/25/24  0415 01/26/24  0313   WBC 11.46  --  12.85*   HGB 13.6*  --  13.0*   HCT 42.0 41 40.7     --  235   MCV 94  --  94   RDW 12.6  --  12.9        Chemistries:  Recent Labs   Lab 01/25/24  0403 01/26/24  0313   * 135*   K 4.9 4.6   CL 99 98   CO2 27 34*   BUN 16 20   CREATININE 0.8  0.8 0.8   CALCIUM 9.5 9.4   ALBUMIN 3.6 3.6   PROT 7.4 7.4   BILITOT 0.4 0.4   ALKPHOS 68 66   ALT 23 17   AST 27 23   MG 2.2 2.4   PHOS 4.0 4.8*       All pertinent labs within the past 24 hours have been reviewed.    Significant Imaging:  I have reviewed all pertinent imaging results/findings within the past 24 hours.  Assessment/Plan:      Pulmonary  * ILD (interstitial lung disease)  Mk Gaines is a 53 y.o. male with PMH of obesity, htn, NESTOR, and a recent diagnosis of interstitial lung disease who is sp VATs with wedge biopsy for diagnostic purposes on 1/24/24. Stepped up to the ICU for hypercapnia and AMS overnight for further monitoring      Neuro/Psych:   -- Sedation: none  -- Pain: tylenol 1000 q8, robaxin 500 QID, Toradol 15mg q8, lidocaine, oxy 5/10 prn             Cards:   -- HDS  Hx of htn  -- Restart home amlodipine and home losartan when appropriate      Pulm:   Sp VATS/ Wedge Resection of lung- right upper lobe, right middle lobe, and right lower lobe on 1/25  Stepped up for hypercapnia and AMS 1/25 am  -- CXR this am stable from yesterday  -- Aggressive incentive spirometry   -- R chest tube removed yesterday  -- Goal O2 sat > 88%, on RA  -- CPAP QHS      Renal:  -- BUN/Cr wnl  Recent Labs   Lab 01/25/24  0403 01/26/24 0313   BUN 16 20   CREATININE 0.8  0.8 0.8       -- Urine output: 2.4L in past 24 hours, has unmeasured voids as well       FEN / GI:   -- Net negative 1.2L but this does not include unmeasured voids  -- Replace lytes as needed  -- Nutrition: reg diet  -- bowel regiment: miralax and doc-senna, adding suppository      ID:   -- Tm: afebrile; WBC slight uptrend from 11.5 to 12.9  -- fu intra op cultures (1/24): NGTD  Recent Labs   Lab 01/25/24  0403 01/26/24  0313   WBC 11.46 12.85*       -- Abx none      Heme/Onc:  -- H/H stable   -- Daily CBC  Recent Labs   Lab 01/25/24  0403 01/26/24  0313   HGB 13.6* 13.0*    235           Endo:   -- Gluc goal 140-180  -- SSI      PPx:   Feeding: reg diet  Analgesia/Sedation: MMD/none  Thromboembolic prevention: lovenox  HOB >30: yes  Stress Ulcer ppx: n/a  Glucose control: Critical care goal 140-180 g/dl, SSI  Bowel reg: miralax, senna docusate and suppository  Invasive Lines/Drains/Airway: PIV's  Deescalation: pending SD        Dispo/Code Status/Palliative:   -- step down /  Full Code   -- appreciate nursing assistance with aggressive walking/OOB to chair            Bridgette Polanco MD  Critical Care - Surgery  [facility]

## 2024-01-26 NOTE — ASSESSMENT & PLAN NOTE
Mk Gaines is a 53 y.o. male with PMH of obesity, htn, NESTOR, and a recent diagnosis of interstitial lung disease who is sp VATs with wedge biopsy for diagnostic purposes on 1/24/24. Stepped up to the ICU for hypercapnia and AMS overnight for further monitoring      Neuro/Psych:   -- Sedation: none  -- Pain: tylenol 1000 q8, robaxin 500 QID, Toradol 15mg q8, lidocaine, oxy 5/10 prn             Cards:   -- HDS  Hx of htn  -- Restart home amlodipine and home losartan when appropriate      Pulm:   Sp VATS/ Wedge Resection of lung- right upper lobe, right middle lobe, and right lower lobe on 1/25  Stepped up for hypercapnia and AMS 1/25 am  -- CXR this am stable from yesterday  -- Aggressive incentive spirometry   -- R chest tube removed yesterday  -- Goal O2 sat > 88%, on RA  -- CPAP QHS      Renal:  -- BUN/Cr wnl  Recent Labs   Lab 01/25/24  0403 01/26/24 0313   BUN 16 20   CREATININE 0.8  0.8 0.8       -- Urine output: 2.4L in past 24 hours, has unmeasured voids as well       FEN / GI:   -- Net negative 1.2L but this does not include unmeasured voids  -- Replace lytes as needed  -- Nutrition: reg diet  -- bowel regiment: miralax and doc-senna, adding suppository      ID:   -- Tm: afebrile; WBC slight uptrend from 11.5 to 12.9  -- fu intra op cultures (1/24): NGTD  Recent Labs   Lab 01/25/24  0403 01/26/24  0313   WBC 11.46 12.85*       -- Abx none      Heme/Onc:  -- H/H stable   -- Daily CBC  Recent Labs   Lab 01/25/24  0403 01/26/24  0313   HGB 13.6* 13.0*    235           Endo:   -- Gluc goal 140-180  -- SSI      PPx:   Feeding: reg diet  Analgesia/Sedation: MMD/none  Thromboembolic prevention: lovenox  HOB >30: yes  Stress Ulcer ppx: n/a  Glucose control: Critical care goal 140-180 g/dl, SSI  Bowel reg: miralax, senna docusate and suppository  Invasive Lines/Drains/Airway: PIV's  Deescalation: pending SD        Dispo/Code Status/Palliative:   -- step down / Full Code   -- appreciate nursing assistance with  aggressive walking/OOB to chair

## 2024-01-26 NOTE — PROGRESS NOTES
[facility] - Surgical Intensive Care  Thoracic Surgery  Progress Note    Subjective:     History of Present Illness:  No notes on file    Post-Op Info:  Procedure(s) (LRB):  RIGHT VATS WEDGE BIOPSY (Right)   2 Days Post-Op     Interval History:   NAEON; CPAP overnight.   CT removed yesterday       Medications:  Continuous Infusions:  Scheduled Meds:   acetaminophen  1,000 mg Oral Q8H    albuterol-ipratropium  3 mL Nebulization Q6H WAKE    bisacodyL  10 mg Rectal Once    enoxparin  40 mg Subcutaneous Q24H (prophylaxis, 1700)    ketorolac  15 mg Intravenous Q8H    LIDOcaine  1 patch Transdermal Q24H    methocarbamoL  500 mg Oral TID    mupirocin   Nasal BID    polyethylene glycol  17 g Oral BID    senna-docusate 8.6-50 mg  1 tablet Oral BID     PRN Meds:bisacodyL, dextrose 10%, dextrose 10%, glucagon (human recombinant), glucose, glucose, insulin aspart U-100, ondansetron, oxyCODONE, oxyCODONE     Objective:     Vital Signs (Most Recent):  Temp: 98 °F (36.7 °C) (01/26/24 0725)  Pulse: 79 (01/26/24 0738)  Resp: 16 (01/26/24 0738)  BP: 122/87 (01/26/24 0725)  SpO2: 99 % (01/26/24 0738) Vital Signs (24h Range):  Temp:  [97.7 °F (36.5 °C)-99 °F (37.2 °C)] 98 °F (36.7 °C)  Pulse:  [] 79  Resp:  [16-37] 16  SpO2:  [92 %-100 %] 99 %  BP: (115-153)/(70-98) 122/87     Weight: 102.1 kg (225 lb 1.4 oz)  Body mass index is 39.87 kg/m².    SpO2: 99 %       Intake/Output - Last 3 Shifts         01/24 0700 01/25 0659 01/25 0700 01/26 0659 01/26 0700 01/27 0659    P.O.  1200     IV Piggyback 1094.6      Total Intake(mL/kg) 1094.6 (10.7) 1200 (11.8)     Urine (mL/kg/hr)  2400 (1)     Stool 0      Chest Tube 135 12     Total Output 135 2412     Net +959.6 -1212            Urine Occurrence   1 x    Stool Occurrence 0 x              Lines/Drains/Airways       Peripheral Intravenous Line  Duration                  Peripheral IV - Single Lumen 01/24/24 0635 18 G Left;Posterior Forearm 2 days         Peripheral IV - Single Lumen  01/24/24 0844 18 G Right;Anterior Forearm 2 days                    Physical Exam  Vitals and nursing note reviewed.   Constitutional:       General: He is not in acute distress.  HENT:      Head: Normocephalic and atraumatic.   Cardiovascular:      Rate and Rhythm: Normal rate and regular rhythm.   Pulmonary:      Effort: Pulmonary effort is normal. No respiratory distress.      Comments: Incisions c/d/I, dressings in place   Abdominal:      General: Abdomen is flat. There is no distension.      Palpations: Abdomen is soft.   Skin:     General: Skin is warm and dry.   Neurological:      General: No focal deficit present.      Mental Status: He is alert and oriented to person, place, and time.   Psychiatric:         Mood and Affect: Mood normal.         Behavior: Behavior normal.            Significant Labs:  All pertinent labs from the last 24 hours have been reviewed.    Significant Diagnostics:  I have reviewed all pertinent imaging results/findings within the past 24 hours.  Assessment/Plan:     * ILD (interstitial lung disease)  53M s/p diagnostic VATS wedge resection x3 for presumed interstitial lung disease. Transferred to ICU overnight for AMS and hypercapnic respiratory acidosis.      Chest tube removed  Continue diuresis   Multimodal analgesia  Encouraged IS, OOB, ambulation  Pending step down  Walk test today    Dispo: pending stability today, potential DC this pm        Bri Lindo MD  Thoracic Surgery  [facility] - Surgical Intensive Care

## 2024-01-26 NOTE — HOSPITAL COURSE
On 1/24/24, Mk Gaines was taken to the operating room and uneventfully underwent the above listed procedure.  Patient was admitted post-operatively for chest tube management and pain control. In the early morning of POD 1, patient exhibited altered mental status with hypercapnic respiratory acidosis after he had been sleeping. He was transferred to the surgical ICU for non-invasive positive pressure ventilation and recovered well. Rested comfortably with good oxygen saturations overnight POD 1-2. Clinical picture suggestive of significant obstructive sleep apnea but no formal diagnosis. In the evening of POD 1, CXR and chest tube output were appropriate for tube removal.  At the time of discharge, patient demonstrated tolerance of a regular diet, ability to ambulate without assistance, appropriate use of an incentive spirometer as instructed, and pain was adequately controlled with a multimodal analgesic regimen.  Patient was cleared for discharge home on POD 2 in good condition. In addition to post-op follow-up, we will also have him see his PCP in the near future to consider getting a sleep study arranged as he would likely benefit from a CPAP at home.

## 2024-01-26 NOTE — PLAN OF CARE
[facility]W was unable to schedule pt 1 week follow up appointment.. The pt PCP office will reach out to the pt in regards to getting him schedule for a follow up appointment.

## 2024-01-26 NOTE — SUBJECTIVE & OBJECTIVE
Interval History/Significant Events: NAEON. He is alert and oriented this morning. Patient used BiPAP overnight and is now on RA satting 99%. He requests something to help have a bowel movement as he has not had one in 2 days. Abdomen non-tender. Patient continues to be stable for step down. [place]ese interpretor used during encounter.    Follow-up For: Procedure(s) (LRB):  RIGHT VATS WEDGE BIOPSY (Right)    Post-Operative Day: 2 Days Post-Op    Objective:     Vital Signs (Most Recent):  Temp: 98 °F (36.7 °C) (01/26/24 0725)  Pulse: 79 (01/26/24 0738)  Resp: 16 (01/26/24 0738)  BP: 122/87 (01/26/24 0725)  SpO2: 99 % (01/26/24 0738) Vital Signs (24h Range):  Temp:  [97.7 °F (36.5 °C)-99 °F (37.2 °C)] 98 °F (36.7 °C)  Pulse:  [] 79  Resp:  [16-37] 16  SpO2:  [92 %-100 %] 99 %  BP: (115-153)/(70-98) 122/87     Weight: 102.1 kg (225 lb 1.4 oz)  Body mass index is 39.87 kg/m².      Intake/Output Summary (Last 24 hours) at 1/26/2024 0847  Last data filed at 1/26/2024 0300  Gross per 24 hour   Intake 840 ml   Output 1808 ml   Net -968 ml          Physical Exam  Vitals and nursing note reviewed.   Constitutional:       General: He is not in acute distress.     Appearance: Normal appearance. He is obese. He is not ill-appearing, toxic-appearing or diaphoretic.   HENT:      Head: Normocephalic and atraumatic.      Right Ear: External ear normal.      Left Ear: External ear normal.      Nose: Nose normal.   Eyes:      General: No scleral icterus.        Right eye: No discharge.         Left eye: No discharge.      Extraocular Movements: Extraocular movements intact.   Cardiovascular:      Rate and Rhythm: Normal rate and regular rhythm.   Pulmonary:      Effort: Pulmonary effort is normal. No respiratory distress.   Abdominal:      Palpations: There is no mass.      Tenderness: There is no abdominal tenderness. There is no guarding.      Comments: Protuberant abdomen   Musculoskeletal:         General: No swelling or  deformity. Normal range of motion.      Cervical back: Normal range of motion and neck supple.      Right lower leg: No edema.      Left lower leg: No edema.   Skin:     General: Skin is warm.      Coloration: Skin is not jaundiced.      Findings: No bruising.   Neurological:      Mental Status: He is alert and oriented to person, place, and time.   Psychiatric:         Mood and Affect: Mood normal.         Behavior: Behavior normal.         Thought Content: Thought content normal.            Vents:  Oxygen Concentration (%): 50 (01/26/24 0600)    Lines/Drains/Airways       Peripheral Intravenous Line  Duration                  Peripheral IV - Single Lumen 01/24/24 0635 18 G Left;Posterior Forearm 2 days         Peripheral IV - Single Lumen 01/24/24 0844 18 G Right;Anterior Forearm 2 days                    Significant Labs:    CBC/Anemia Profile:  Recent Labs   Lab 01/25/24  0403 01/25/24  0415 01/26/24  0313   WBC 11.46  --  12.85*   HGB 13.6*  --  13.0*   HCT 42.0 41 40.7     --  235   MCV 94  --  94   RDW 12.6  --  12.9        Chemistries:  Recent Labs   Lab 01/25/24  0403 01/26/24  0313   * 135*   K 4.9 4.6   CL 99 98   CO2 27 34*   BUN 16 20   CREATININE 0.8  0.8 0.8   CALCIUM 9.5 9.4   ALBUMIN 3.6 3.6   PROT 7.4 7.4   BILITOT 0.4 0.4   ALKPHOS 68 66   ALT 23 17   AST 27 23   MG 2.2 2.4   PHOS 4.0 4.8*       All pertinent labs within the past 24 hours have been reviewed.    Significant Imaging:  I have reviewed all pertinent imaging results/findings within the past 24 hours.

## 2024-01-27 LAB
BACTERIA SPEC AEROBE CULT: NO GROWTH

## 2024-01-27 NOTE — NURSING
Patient discharged home. AVS given and went over with  #[id number]. Patient educated on discharge meds, wound care, and follow up appts. All belongings taken with patient, wife, and son.

## 2024-01-29 LAB
BACTERIA SPEC ANAEROBE CULT: ABNORMAL
BACTERIA SPEC ANAEROBE CULT: NORMAL

## 2024-01-31 LAB
BACTERIA SPEC ANAEROBE CULT: NORMAL
BACTERIA SPEC ANAEROBE CULT: NORMAL

## 2024-02-09 NOTE — PROGRESS NOTES
Subjective     Patient ID: Mk Gaines is a 53 y.o. male.    Chief Complaint: No chief complaint on file.    Diagnosis:  ILD    Procedure(s) and date(s): 01/24/24 - R VATS wedge biopsy    Pathology:   Lung, right upper lobe, wedge resection: Benign lung parenchyma with fibrosis, mixed chronic inflammation, and noncaseating granulomatous inflammation. Fresh alveolar hemorrhage present. Special stains for fungal (GMS) and acid fast (AFB) organisms are negative with appropriate controls. Negative for dysplasia and malignancy  Lung, right middle lobe, wedge resection: Benign alveolar lung parenchyma with interstitial fibrosis, mixed chronic inflammation, and subpleural fibrosis. Negative for dysplasia and malignancy  Lung, right lower lobe, wedge resection: Benign alveolar lung parenchyma with interstitial fibrosis, mixed chronic inflammation, and subpleural fibrosis. Negative for dysplasia and malignancy     HPI  53 y.o. male who presents to clinic for 2 week follow up s/p R VATS wedge biopsy. Tolerated procedure well. Discharged in good condition on POD2.  contacted via Language line for clear and precise communication with patient to provide quality health care.  ID#: [id number]. Today patient reports he is doing well. Pain controlled with medications. Ready to return to ADLs and work as tolerated.     Review of Systems   Constitutional:  Negative for chills, diaphoresis, fatigue, fever and unexpected weight change.   Respiratory: Negative.  Negative for cough, shortness of breath, wheezing and stridor.    Cardiovascular: Negative.  Negative for chest pain, palpitations, leg swelling and claudication.   Integumentary:  Negative.   Neurological: Negative.    Hematological: Negative.    Psychiatric/Behavioral: Negative.          Objective     Physical Exam  Constitutional:       Appearance: Normal appearance. He is obese.   Eyes:      Extraocular Movements: Extraocular movements intact.    Cardiovascular:      Rate and Rhythm: Normal rate and regular rhythm.      Pulses: Normal pulses.   Pulmonary:      Effort: Pulmonary effort is normal.      Breath sounds: Normal breath sounds.   Abdominal:      General: Abdomen is flat.      Palpations: Abdomen is soft.   Skin:     General: Skin is warm and dry.      Comments: Incisions healing well. C/d/i   Neurological:      General: No focal deficit present.      Mental Status: He is alert and oriented to person, place, and time. Mental status is at baseline.   Psychiatric:         Mood and Affect: Mood normal.         Behavior: Behavior normal.         Thought Content: Thought content normal.       Diagnostics:     CXR 02/12/24: Mild cardiomegaly. Diffuse interstitial lung disease similar to the previous study. The right costophrenic angle is slightly blunted laterally. Otherwise no significant pleural effusion        Assessment and Plan   53 y.o. male who presents to clinic for 2 week follow up s/p R VATS wedge biopsy. The patient did inquire about whether environmental/work actiivities may have played a role in development of disease. Explained to the patient that while this may be possible it is difficult to make that determination based on pathology review.    Plan:  Pathology report provided to patient for legal consideration  PRN follow up   Return to pulmonologist for further management and care

## 2024-02-12 ENCOUNTER — OFFICE VISIT (OUTPATIENT)
Dept: CARDIOTHORACIC SURGERY | Facility: CLINIC | Age: 54
End: 2024-02-12
Payer: MEDICAID

## 2024-02-12 ENCOUNTER — HOSPITAL ENCOUNTER (OUTPATIENT)
Dept: RADIOLOGY | Facility: HOSPITAL | Age: 54
Discharge: HOME | End: 2024-02-12
Payer: MEDICAID

## 2024-02-12 VITALS
DIASTOLIC BLOOD PRESSURE: 82 MMHG | HEIGHT: 63 IN | OXYGEN SATURATION: 96 % | BODY MASS INDEX: 37.46 KG/M2 | HEART RATE: 97 BPM | WEIGHT: 211.44 LBS | SYSTOLIC BLOOD PRESSURE: 123 MMHG

## 2024-02-12 DIAGNOSIS — J84.9 ILD (INTERSTITIAL LUNG DISEASE): ICD-10-CM

## 2024-02-12 DIAGNOSIS — J84.9 ILD (INTERSTITIAL LUNG DISEASE): Primary | ICD-10-CM

## 2024-02-12 PROCEDURE — 4010F ACE/ARB THERAPY RXD/TAKEN: CPT | Mod: CPTII,,, | Performed by: STUDENT IN AN ORGANIZED HEALTH CARE EDUCATION/TRAINING PROGRAM

## 2024-02-12 PROCEDURE — 3074F SYST BP LT 130 MM HG: CPT | Mod: CPTII,,, | Performed by: STUDENT IN AN ORGANIZED HEALTH CARE EDUCATION/TRAINING PROGRAM

## 2024-02-12 PROCEDURE — 99024 POSTOP FOLLOW-UP VISIT: CPT | Mod: ,,, | Performed by: STUDENT IN AN ORGANIZED HEALTH CARE EDUCATION/TRAINING PROGRAM

## 2024-02-12 PROCEDURE — 3079F DIAST BP 80-89 MM HG: CPT | Mod: CPTII,,, | Performed by: STUDENT IN AN ORGANIZED HEALTH CARE EDUCATION/TRAINING PROGRAM

## 2024-02-12 PROCEDURE — 99213 OFFICE O/P EST LOW 20 MIN: CPT | Mod: PBBFAC,25 | Performed by: STUDENT IN AN ORGANIZED HEALTH CARE EDUCATION/TRAINING PROGRAM

## 2024-02-12 PROCEDURE — 1159F MED LIST DOCD IN RCRD: CPT | Mod: CPTII,,, | Performed by: STUDENT IN AN ORGANIZED HEALTH CARE EDUCATION/TRAINING PROGRAM

## 2024-02-12 PROCEDURE — 71046 X-RAY EXAM CHEST 2 VIEWS: CPT | Mod: TC

## 2024-02-12 PROCEDURE — 99999 PR PBB SHADOW E&M-EST. PATIENT-LVL III: CPT | Mod: PBBFAC,,, | Performed by: STUDENT IN AN ORGANIZED HEALTH CARE EDUCATION/TRAINING PROGRAM

## 2024-02-12 PROCEDURE — 71046 X-RAY EXAM CHEST 2 VIEWS: CPT | Mod: 26,,, | Performed by: RADIOLOGY

## 2024-02-12 PROCEDURE — 3044F HG A1C LEVEL LT 7.0%: CPT | Mod: CPTII,,, | Performed by: STUDENT IN AN ORGANIZED HEALTH CARE EDUCATION/TRAINING PROGRAM

## 2024-02-26 LAB
FUNGUS SPEC CULT: NORMAL

## 2024-03-13 LAB
ACID FAST MOD KINY STN SPEC: NORMAL
MYCOBACTERIUM SPEC QL CULT: NORMAL

## 2024-03-28 LAB
FINAL PATHOLOGIC DIAGNOSIS: NORMAL
GROSS: NORMAL
Lab: NORMAL
SUPPLEMENTAL DIAGNOSIS: NORMAL

## 2024-04-29 ENCOUNTER — PATIENT OUTREACH (OUTPATIENT)
Dept: ADMINISTRATIVE | Facility: HOSPITAL | Age: 54
End: 2024-04-29
Payer: MEDICAID

## 2024-05-31 PROBLEM — J67.9 HYPERSENSITIVITY PNEUMONITIS: Status: ACTIVE | Noted: 2024-05-31

## 2024-06-13 PROBLEM — Z92.89 H/O CARDIOVASCULAR STRESS TEST: Status: ACTIVE | Noted: 2024-06-13

## 2024-10-07 PROBLEM — G47.33 OSA (OBSTRUCTIVE SLEEP APNEA): Status: ACTIVE | Noted: 2024-10-07

## 2024-12-18 DIAGNOSIS — G47.33 OBSTRUCTIVE SLEEP APNEA (ADULT) (PEDIATRIC): Primary | ICD-10-CM

## 2024-12-19 ENCOUNTER — HOSPITAL ENCOUNTER (OUTPATIENT)
Dept: SLEEP MEDICINE | Facility: HOSPITAL | Age: 54
Discharge: HOME | End: 2024-12-19
Attending: STUDENT IN AN ORGANIZED HEALTH CARE EDUCATION/TRAINING PROGRAM
Payer: MEDICAID

## 2024-12-19 DIAGNOSIS — G47.33 OBSTRUCTIVE SLEEP APNEA (ADULT) (PEDIATRIC): ICD-10-CM

## (undated) DEVICE — GAUZE SPONGE 4X4 12PLY

## (undated) DEVICE — SCISSOR 5MMX35CM DIRECT DRIVE

## (undated) DEVICE — TOWEL OR DISP STRL BLUE 4/PK

## (undated) DEVICE — DRESSING TELFA STRL 4X3 LF

## (undated) DEVICE — SUT 2/0 30IN SILK BLK BRAI

## (undated) DEVICE — CLOSURE SKIN STERI STRIP 1/2X4

## (undated) DEVICE — TRAY MINOR GEN SURG OMC

## (undated) DEVICE — GOWN POLY REINF BRTH SLV XL

## (undated) DEVICE — KIT ANTIFOG W/SPONG & FLUID

## (undated) DEVICE — TUBING SUC UNIV W/CONN 12FT

## (undated) DEVICE — GOWN SMART IMP BREATHABLE XXLG

## (undated) DEVICE — ELECTRODE BLADE INSULATED 1 IN

## (undated) DEVICE — ADHESIVE MASTISOL VIAL 48/BX

## (undated) DEVICE — SUT 0 VICRYL / CT-1

## (undated) DEVICE — DRAPE STERI INSTRUMENT 1018

## (undated) DEVICE — DRAPE ABDOMINAL TIBURON 14X11

## (undated) DEVICE — BLADE ELECTRO EDGE INSULATED

## (undated) DEVICE — ELECTRODE REM PLYHSV RETURN 9

## (undated) DEVICE — BAG INZII TISS RETRV 12/15MM

## (undated) DEVICE — DRAIN CHEST DRY SUCTION

## (undated) DEVICE — DRAPE INCISE IOBAN 2 23X17IN

## (undated) DEVICE — SPONGE COTTON TRAY 4X4IN

## (undated) DEVICE — PAD CURAD NONADH 3X4IN

## (undated) DEVICE — SUT 2-0 VICRYL / CT-1

## (undated) DEVICE — GLOVE BIOGEL ORTHOPEDIC 7.5

## (undated) DEVICE — PLEDGET SUT SOFT 3/8X3/16X1/16

## (undated) DEVICE — TROCAR ENDOPATH XCEL 12X100MM

## (undated) DEVICE — SUT MCRYL PLUS 4-0 PS2 27IN

## (undated) DEVICE — DRESSING TRANS 4X4 TEGADERM

## (undated) DEVICE — CONTAINER SPECIMEN STRL 4OZ

## (undated) DEVICE — STAPLER GIA HANDLE STD

## (undated) DEVICE — SUT 0 30IN ETHIBOND EXCEL G

## (undated) DEVICE — RELOAD ENDO GIA TRISTAPLE 45MM

## (undated) DEVICE — TAPE MEDIPORE 4IN X 2YDS